# Patient Record
Sex: MALE | Employment: UNEMPLOYED | URBAN - METROPOLITAN AREA
[De-identification: names, ages, dates, MRNs, and addresses within clinical notes are randomized per-mention and may not be internally consistent; named-entity substitution may affect disease eponyms.]

---

## 2019-05-24 ENCOUNTER — HOSPITAL ENCOUNTER (INPATIENT)
Age: 19
LOS: 14 days | Discharge: NURSING, REHAB OR LONG-TERM CARE | DRG: 641 | End: 2019-06-07
Attending: EMERGENCY MEDICINE | Admitting: INTERNAL MEDICINE
Payer: COMMERCIAL

## 2019-05-24 DIAGNOSIS — E83.42 HYPOMAGNESEMIA: ICD-10-CM

## 2019-05-24 DIAGNOSIS — E83.51 HYPOCALCEMIA: Primary | ICD-10-CM

## 2019-05-24 PROBLEM — R94.31 PROLONGED Q-T INTERVAL ON ECG: Status: ACTIVE | Noted: 2019-05-24

## 2019-05-24 PROBLEM — E89.0 H/O THYROIDECTOMY: Status: ACTIVE | Noted: 2019-05-24

## 2019-05-24 LAB
ALBUMIN SERPL-MCNC: 4.9 G/DL (ref 3.5–5.2)
ALP BLD-CCNC: 122 U/L (ref 40–129)
ALT SERPL-CCNC: 45 U/L (ref 0–40)
ANION GAP SERPL CALCULATED.3IONS-SCNC: 14 MMOL/L (ref 7–16)
AST SERPL-CCNC: 43 U/L (ref 0–39)
BASOPHILS ABSOLUTE: 0.05 E9/L (ref 0–0.2)
BASOPHILS RELATIVE PERCENT: 1 % (ref 0–2)
BILIRUB SERPL-MCNC: 1.4 MG/DL (ref 0–1.2)
BILIRUBIN URINE: NEGATIVE
BLOOD, URINE: NEGATIVE
BUN BLDV-MCNC: 10 MG/DL (ref 6–20)
CALCIUM IONIZED: 0.69 MMOL/L (ref 1.15–1.33)
CALCIUM SERPL-MCNC: 5.2 MG/DL (ref 8.6–10.2)
CHLORIDE BLD-SCNC: 102 MMOL/L (ref 98–107)
CLARITY: CLEAR
CO2: 28 MMOL/L (ref 22–29)
COLOR: YELLOW
CREAT SERPL-MCNC: 1.1 MG/DL (ref 0.7–1.2)
EKG ATRIAL RATE: 83 BPM
EKG P AXIS: 73 DEGREES
EKG P-R INTERVAL: 180 MS
EKG Q-T INTERVAL: 426 MS
EKG QRS DURATION: 90 MS
EKG QTC CALCULATION (BAZETT): 500 MS
EKG R AXIS: 87 DEGREES
EKG T AXIS: 66 DEGREES
EKG VENTRICULAR RATE: 83 BPM
EOSINOPHILS ABSOLUTE: 0.02 E9/L (ref 0.05–0.5)
EOSINOPHILS RELATIVE PERCENT: 0.4 % (ref 0–6)
GFR AFRICAN AMERICAN: >60
GFR NON-AFRICAN AMERICAN: >60 ML/MIN/1.73
GLUCOSE BLD-MCNC: 91 MG/DL (ref 74–99)
GLUCOSE URINE: NEGATIVE MG/DL
HCT VFR BLD CALC: 39.1 % (ref 37–54)
HEMOGLOBIN: 13.8 G/DL (ref 12.5–16.5)
IMMATURE GRANULOCYTES #: 0.01 E9/L
IMMATURE GRANULOCYTES %: 0.2 % (ref 0–5)
KETONES, URINE: NEGATIVE MG/DL
LEUKOCYTE ESTERASE, URINE: NEGATIVE
LYMPHOCYTES ABSOLUTE: 1.14 E9/L (ref 1.5–4)
LYMPHOCYTES RELATIVE PERCENT: 23.5 % (ref 20–42)
MAGNESIUM: 1.5 MG/DL (ref 1.6–2.6)
MCH RBC QN AUTO: 30.2 PG (ref 26–35)
MCHC RBC AUTO-ENTMCNC: 35.3 % (ref 32–34.5)
MCV RBC AUTO: 85.6 FL (ref 80–99.9)
MONOCYTES ABSOLUTE: 0.4 E9/L (ref 0.1–0.95)
MONOCYTES RELATIVE PERCENT: 8.2 % (ref 2–12)
NEUTROPHILS ABSOLUTE: 3.24 E9/L (ref 1.8–7.3)
NEUTROPHILS RELATIVE PERCENT: 66.7 % (ref 43–80)
NITRITE, URINE: NEGATIVE
PDW BLD-RTO: 11.4 FL (ref 11.5–15)
PH UA: 7.5 (ref 5–9)
PLATELET # BLD: 235 E9/L (ref 130–450)
PMV BLD AUTO: 10.8 FL (ref 7–12)
POTASSIUM SERPL-SCNC: 4.5 MMOL/L (ref 3.5–5)
PROTEIN UA: NEGATIVE MG/DL
RBC # BLD: 4.57 E12/L (ref 3.8–5.8)
SODIUM BLD-SCNC: 144 MMOL/L (ref 132–146)
SPECIFIC GRAVITY UA: 1.01 (ref 1–1.03)
TOTAL PROTEIN: 7.6 G/DL (ref 6.4–8.3)
UROBILINOGEN, URINE: 1 E.U./DL
WBC # BLD: 4.9 E9/L (ref 4.5–11.5)

## 2019-05-24 PROCEDURE — 2140000000 HC CCU INTERMEDIATE R&B

## 2019-05-24 PROCEDURE — 82330 ASSAY OF CALCIUM: CPT

## 2019-05-24 PROCEDURE — 96365 THER/PROPH/DIAG IV INF INIT: CPT

## 2019-05-24 PROCEDURE — 93010 ELECTROCARDIOGRAM REPORT: CPT | Performed by: INTERNAL MEDICINE

## 2019-05-24 PROCEDURE — 6360000002 HC RX W HCPCS: Performed by: PHYSICIAN ASSISTANT

## 2019-05-24 PROCEDURE — 80053 COMPREHEN METABOLIC PANEL: CPT

## 2019-05-24 PROCEDURE — 83735 ASSAY OF MAGNESIUM: CPT

## 2019-05-24 PROCEDURE — 93005 ELECTROCARDIOGRAM TRACING: CPT | Performed by: NURSE PRACTITIONER

## 2019-05-24 PROCEDURE — 6360000002 HC RX W HCPCS: Performed by: INTERNAL MEDICINE

## 2019-05-24 PROCEDURE — 36415 COLL VENOUS BLD VENIPUNCTURE: CPT

## 2019-05-24 PROCEDURE — 96367 TX/PROPH/DG ADDL SEQ IV INF: CPT

## 2019-05-24 PROCEDURE — 2580000003 HC RX 258

## 2019-05-24 PROCEDURE — 85025 COMPLETE CBC W/AUTO DIFF WBC: CPT

## 2019-05-24 PROCEDURE — 81003 URINALYSIS AUTO W/O SCOPE: CPT

## 2019-05-24 PROCEDURE — 2580000003 HC RX 258: Performed by: INTERNAL MEDICINE

## 2019-05-24 PROCEDURE — 99285 EMERGENCY DEPT VISIT HI MDM: CPT

## 2019-05-24 PROCEDURE — 2500000003 HC RX 250 WO HCPCS: Performed by: PHYSICIAN ASSISTANT

## 2019-05-24 RX ORDER — ACETAMINOPHEN 325 MG/1
650 TABLET ORAL EVERY 6 HOURS PRN
Status: ON HOLD | COMMUNITY
End: 2019-06-06 | Stop reason: HOSPADM

## 2019-05-24 RX ORDER — ONDANSETRON 2 MG/ML
4 INJECTION INTRAMUSCULAR; INTRAVENOUS EVERY 6 HOURS PRN
Status: CANCELLED | OUTPATIENT
Start: 2019-05-24

## 2019-05-24 RX ORDER — MAGNESIUM SULFATE IN WATER 40 MG/ML
2 INJECTION, SOLUTION INTRAVENOUS ONCE
Status: COMPLETED | OUTPATIENT
Start: 2019-05-24 | End: 2019-05-24

## 2019-05-24 RX ORDER — SODIUM CHLORIDE 0.9 % (FLUSH) 0.9 %
SYRINGE (ML) INJECTION
Status: COMPLETED
Start: 2019-05-24 | End: 2019-05-24

## 2019-05-24 RX ORDER — DOCUSATE SODIUM 100 MG/1
100 CAPSULE, LIQUID FILLED ORAL NIGHTLY
Status: DISCONTINUED | OUTPATIENT
Start: 2019-05-24 | End: 2019-05-25

## 2019-05-24 RX ORDER — IBUPROFEN 200 MG
1 CAPSULE ORAL 4 TIMES DAILY
COMMUNITY

## 2019-05-24 RX ORDER — ACETAMINOPHEN 325 MG/1
650 TABLET ORAL EVERY 4 HOURS PRN
Status: DISCONTINUED | OUTPATIENT
Start: 2019-05-24 | End: 2019-06-07 | Stop reason: HOSPADM

## 2019-05-24 RX ORDER — DOCUSATE SODIUM 100 MG/1
100 CAPSULE, LIQUID FILLED ORAL 2 TIMES DAILY PRN
Status: ON HOLD | COMMUNITY
End: 2019-06-06 | Stop reason: HOSPADM

## 2019-05-24 RX ORDER — SODIUM CHLORIDE 0.9 % (FLUSH) 0.9 %
10 SYRINGE (ML) INJECTION 2 TIMES DAILY
Status: DISCONTINUED | OUTPATIENT
Start: 2019-05-24 | End: 2019-06-07 | Stop reason: HOSPADM

## 2019-05-24 RX ORDER — CALCITRIOL 0.5 UG/1
0.5 CAPSULE, LIQUID FILLED ORAL 2 TIMES DAILY
Status: ON HOLD | COMMUNITY
End: 2019-06-06 | Stop reason: HOSPADM

## 2019-05-24 RX ORDER — PANTOPRAZOLE SODIUM 40 MG/1
40 TABLET, DELAYED RELEASE ORAL
Status: DISCONTINUED | OUTPATIENT
Start: 2019-05-25 | End: 2019-06-05

## 2019-05-24 RX ADMIN — Medication 10 ML: at 18:39

## 2019-05-24 RX ADMIN — MAGNESIUM SULFATE HEPTAHYDRATE 2 G: 40 INJECTION, SOLUTION INTRAVENOUS at 15:57

## 2019-05-24 RX ADMIN — ENOXAPARIN SODIUM 40 MG: 40 INJECTION SUBCUTANEOUS at 18:39

## 2019-05-24 RX ADMIN — CALCIUM GLUCONATE 1 G: 98 INJECTION, SOLUTION INTRAVENOUS at 18:39

## 2019-05-24 RX ADMIN — Medication 1 G: at 14:51

## 2019-05-24 RX ADMIN — CALCIUM GLUCONATE: 98 INJECTION, SOLUTION INTRAVENOUS at 19:50

## 2019-05-24 ASSESSMENT — PAIN SCALES - GENERAL: PAINLEVEL_OUTOF10: 0

## 2019-05-24 NOTE — ED PROVIDER NOTES
bilaterally, no wheezes, rales, or rhonchi. Not in respiratory distress  · CV:  Regular rate. Regular rhythm. No murmurs, gallops, or rubs. 2+ distal pulses  · Chest: No chest wall tenderness  · GI:  Abdomen Soft, Non tender, Non distended. +BS. No rebound, guarding, or rigidity. No pulsatile masses. · Musculoskeletal: Moves all extremities x 4. Warm and well perfused, no clubbing, cyanosis, or edema. Capillary refill <3 seconds  · Integument: skin warm and dry. No rashes.    · Lymphatic: no lymphadenopathy noted  · Neurologic: GCS 15, no focal deficits, symmetric strength 5/5 in the upper and lower extremities bilaterally  · Psychiatric: Normal Affect      Lab / Imaging Results   (All laboratory and radiology results have been personally reviewed by myself)  Labs:  Results for orders placed or performed during the hospital encounter of 05/24/19   CBC Auto Differential   Result Value Ref Range    WBC 4.9 4.5 - 11.5 E9/L    RBC 4.57 3.80 - 5.80 E12/L    Hemoglobin 13.8 12.5 - 16.5 g/dL    Hematocrit 39.1 37.0 - 54.0 %    MCV 85.6 80.0 - 99.9 fL    MCH 30.2 26.0 - 35.0 pg    MCHC 35.3 (H) 32.0 - 34.5 %    RDW 11.4 (L) 11.5 - 15.0 fL    Platelets 953 367 - 670 E9/L    MPV 10.8 7.0 - 12.0 fL    Neutrophils % 66.7 43.0 - 80.0 %    Immature Granulocytes % 0.2 0.0 - 5.0 %    Lymphocytes % 23.5 20.0 - 42.0 %    Monocytes % 8.2 2.0 - 12.0 %    Eosinophils % 0.4 0.0 - 6.0 %    Basophils % 1.0 0.0 - 2.0 %    Neutrophils # 3.24 1.80 - 7.30 E9/L    Immature Granulocytes # 0.01 E9/L    Lymphocytes # 1.14 (L) 1.50 - 4.00 E9/L    Monocytes # 0.40 0.10 - 0.95 E9/L    Eosinophils # 0.02 (L) 0.05 - 0.50 E9/L    Basophils # 0.05 0.00 - 0.20 E9/L   Comprehensive Metabolic Panel   Result Value Ref Range    Sodium 144 132 - 146 mmol/L    Potassium 4.5 3.5 - 5.0 mmol/L    Chloride 102 98 - 107 mmol/L    CO2 28 22 - 29 mmol/L    Anion Gap 14 7 - 16 mmol/L    Glucose 91 74 - 99 mg/dL    BUN 10 6 - 20 mg/dL    CREATININE 1.1 0.7 - 1.2 mg/dL    GFR Non-African American >60 >=60 mL/min/1.73    GFR African American >60     Calcium 5.2 (LL) 8.6 - 10.2 mg/dL    Total Protein 7.6 6.4 - 8.3 g/dL    Alb 4.9 3.5 - 5.2 g/dL    Total Bilirubin 1.4 (H) 0.0 - 1.2 mg/dL    Alkaline Phosphatase 122 40 - 129 U/L    ALT 45 (H) 0 - 40 U/L    AST 43 (H) 0 - 39 U/L   Magnesium   Result Value Ref Range    Magnesium 1.5 (L) 1.6 - 2.6 mg/dL   Urinalysis   Result Value Ref Range    Color, UA Yellow Straw/Yellow    Clarity, UA Clear Clear    Glucose, Ur Negative Negative mg/dL    Bilirubin Urine Negative Negative    Ketones, Urine Negative Negative mg/dL    Specific Gravity, UA 1.010 1.005 - 1.030    Blood, Urine Negative Negative    pH, UA 7.5 5.0 - 9.0    Protein, UA Negative Negative mg/dL    Urobilinogen, Urine 1.0 <2.0 E.U./dL    Nitrite, Urine Negative Negative    Leukocyte Esterase, Urine Negative Negative   CALCIUM, IONIZED   Result Value Ref Range    Calcium, Ion 0.69 (LL) 1.15 - 1.33 mmol/L   EKG 12 Lead   Result Value Ref Range    Ventricular Rate 83 BPM    Atrial Rate 83 BPM    P-R Interval 180 ms    QRS Duration 90 ms    Q-T Interval 426 ms    QTc Calculation (Bazett) 500 ms    P Axis 73 degrees    R Axis 87 degrees    T Axis 66 degrees     Imaging: All Radiology results interpreted by Radiologist unless otherwise noted. No orders to display     EKG #1:  Interpreted by emergency department physician unless otherwise noted. Time:  1255    Rate: 83  Rhythm: Sinus. Interpretation: normal sinus rhythm, prolonged QT interval, there are no previous tracings available for comparison.       ED Course / Medical Decision Making     Medications   docusate sodium (COLACE) capsule 100 mg (has no administration in time range)   pantoprazole (PROTONIX) tablet 40 mg (has no administration in time range)   enoxaparin (LOVENOX) injection 40 mg (has no administration in time range)   acetaminophen (TYLENOL) tablet 650 mg (has no administration in time range)   vitamin D 1800

## 2019-05-24 NOTE — H&P
has no tobacco history on file. ETOH:   has no alcohol history on file. Had a past history of marijuana use    Family History:   Mom  of an MI at 28    , dads history unknown and MI at 28    REVIEW OF SYSTEMS:   Pertinent positives as noted in the HPI. All other systems reviewed and negative. PHYSICAL EXAM:    BP (!) 147/96   Pulse 85   Temp 97.1 °F (36.2 °C) (Temporal)   Resp 16   Wt 190 lb (86.2 kg)   SpO2 100%     General appearance:  No apparent distress, appears stated age and cooperative. HEENT:  Normal cephalic, atraumatic without obvious deformity. Pupils equal, round, and reactive to light. Extra ocular muscles intact. Conjunctivae/corneas clear. Neck: Supple, with full range of motion. No jugular venous distention. Trachea midline. Surgical scar in line with previous surgery  Respiratory:  Normal respiratory effort. Clear to auscultation, bilaterally without Rales/Wheezes/Rhonchi. Cardiovascular:  Regular rate and rhythm with normal S1/S2 without murmurs, rubs or gallops. Abdomen: Soft, non-tender, non-distended with normal bowel sounds. Musculoskeletal:  No clubbing, cyanosis or edema bilaterally. Full range of motion without deformity. Skin: Skin color, texture, turgor normal.  No rashes or lesions. Neurologic:  Neurovascularly intact without any focal sensory/motor deficits. Cranial nerves: II-XII intact, grossly non-focal.  Psychiatric:  Alert and oriented, thought content appropriate, normal insight  Capillary Refill: Brisk,< 3 seconds   Peripheral Pulses: +2 palpable, equal bilaterally       Labs:     Recent Labs     19  1300   WBC 4.9   HGB 13.8   HCT 39.1        Recent Labs     19  1300      K 4.5      CO2 28   BUN 10   CREATININE 1.1   CALCIUM 5.2*     Recent Labs     19  1300   AST 43*   ALT 45*   BILITOT 1.4*   ALKPHOS 122     No results for input(s): INR in the last 72 hours.   No results for input(s): Brice Risser in the last 72 hours.    Urinalysis:    No results found for: Vallery Perches, BACTERIA, RBCUA, BLOODU, [address] [place] [address]    Radiology:         No orders to display       ASSESSMENT:    Active Hospital Problems    Diagnosis Date Noted    Hypocalcemia [E83.51] 2019    Prolonged Q-T interval on ECG [R94.31] 2019    Hypomagnesemia [E83.42] 2019    H/O thyroidectomy [Z98.890] 2019    Hypocalcemia and hypomagnesemia of  [P71.1] 2019   transamintis   h/o parathyroidectomy  H/o thyroid cancer      PLAN:  Intact parathyroid  Consult nephro  Monitor labs  Replace k  Hepatitis panel      DVT Prophylaxis: *lovenox  Diet: No diet orders on file  Code Status: full code    PT/OT Eval Status: *na    Dispo - *back to long-term       John Paul Dowling DO    Thank you No primary care provider on file. for the opportunity to be involved in this patient's care. If you have any questions or concerns please feel free to contact me at [phone].

## 2019-05-25 LAB
ANION GAP SERPL CALCULATED.3IONS-SCNC: 18 MMOL/L (ref 7–16)
BUN BLDV-MCNC: 9 MG/DL (ref 6–20)
CALCIUM IONIZED: 0.72 MMOL/L (ref 1.15–1.33)
CALCIUM IONIZED: 0.73 MMOL/L (ref 1.15–1.33)
CALCIUM IONIZED: 0.84 MMOL/L (ref 1.15–1.33)
CALCIUM IONIZED: 0.85 MMOL/L (ref 1.15–1.33)
CALCIUM IONIZED: 0.88 MMOL/L (ref 1.15–1.33)
CALCIUM SERPL-MCNC: 5.2 MG/DL (ref 8.6–10.2)
CHLORIDE BLD-SCNC: 98 MMOL/L (ref 98–107)
CO2: 24 MMOL/L (ref 22–29)
CREAT SERPL-MCNC: 1 MG/DL (ref 0.7–1.2)
GFR AFRICAN AMERICAN: >60
GFR NON-AFRICAN AMERICAN: >60 ML/MIN/1.73
GLUCOSE BLD-MCNC: 97 MG/DL (ref 74–99)
MAGNESIUM: 1.3 MG/DL (ref 1.6–2.6)
MAGNESIUM: 1.7 MG/DL (ref 1.6–2.6)
PARATHYROID HORMONE INTACT: 4 PG/ML (ref 15–65)
PARATHYROID HORMONE INTACT: 5 PG/ML (ref 15–65)
PHOSPHORUS: 9 MG/DL (ref 2.5–4.5)
POTASSIUM REFLEX MAGNESIUM: 3.7 MMOL/L (ref 3.5–5)
SODIUM BLD-SCNC: 140 MMOL/L (ref 132–146)
TSH SERPL DL<=0.05 MIU/L-ACNC: 2.61 UIU/ML (ref 0.27–4.2)
TSH SERPL DL<=0.05 MIU/L-ACNC: 2.62 UIU/ML (ref 0.27–4.2)
VITAMIN D 25-HYDROXY: 16 NG/ML (ref 30–100)

## 2019-05-25 PROCEDURE — 6360000002 HC RX W HCPCS: Performed by: INTERNAL MEDICINE

## 2019-05-25 PROCEDURE — 84100 ASSAY OF PHOSPHORUS: CPT

## 2019-05-25 PROCEDURE — 82306 VITAMIN D 25 HYDROXY: CPT

## 2019-05-25 PROCEDURE — 2140000000 HC CCU INTERMEDIATE R&B

## 2019-05-25 PROCEDURE — 80074 ACUTE HEPATITIS PANEL: CPT

## 2019-05-25 PROCEDURE — 84443 ASSAY THYROID STIM HORMONE: CPT

## 2019-05-25 PROCEDURE — 80048 BASIC METABOLIC PNL TOTAL CA: CPT

## 2019-05-25 PROCEDURE — 82330 ASSAY OF CALCIUM: CPT

## 2019-05-25 PROCEDURE — 83735 ASSAY OF MAGNESIUM: CPT

## 2019-05-25 PROCEDURE — 2580000003 HC RX 258: Performed by: INTERNAL MEDICINE

## 2019-05-25 PROCEDURE — 6370000000 HC RX 637 (ALT 250 FOR IP): Performed by: INTERNAL MEDICINE

## 2019-05-25 PROCEDURE — 83970 ASSAY OF PARATHORMONE: CPT

## 2019-05-25 PROCEDURE — 36415 COLL VENOUS BLD VENIPUNCTURE: CPT

## 2019-05-25 RX ORDER — MAGNESIUM SULFATE IN WATER 40 MG/ML
2 INJECTION, SOLUTION INTRAVENOUS PRN
Status: DISCONTINUED | OUTPATIENT
Start: 2019-05-25 | End: 2019-05-28

## 2019-05-25 RX ORDER — ERGOCALCIFEROL 1.25 MG/1
50000 CAPSULE ORAL WEEKLY
Status: DISCONTINUED | OUTPATIENT
Start: 2019-05-26 | End: 2019-06-07 | Stop reason: HOSPADM

## 2019-05-25 RX ORDER — MAGNESIUM SULFATE IN WATER 40 MG/ML
2 INJECTION, SOLUTION INTRAVENOUS ONCE
Status: COMPLETED | OUTPATIENT
Start: 2019-05-25 | End: 2019-05-25

## 2019-05-25 RX ORDER — DOCUSATE SODIUM 100 MG/1
100 CAPSULE, LIQUID FILLED ORAL 3 TIMES DAILY
Status: DISCONTINUED | OUTPATIENT
Start: 2019-05-25 | End: 2019-05-27

## 2019-05-25 RX ORDER — CHOLECALCIFEROL (VITAMIN D3) 50 MCG
5000 TABLET ORAL DAILY
Status: DISCONTINUED | OUTPATIENT
Start: 2019-05-26 | End: 2019-05-27

## 2019-05-25 RX ORDER — CALCIUM CARBONATE 200(500)MG
500 TABLET,CHEWABLE ORAL 3 TIMES DAILY
Status: DISCONTINUED | OUTPATIENT
Start: 2019-05-25 | End: 2019-05-26

## 2019-05-25 RX ADMIN — VITAMIN D, TAB 1000IU (100/BT) 1000 UNITS: 25 TAB at 08:22

## 2019-05-25 RX ADMIN — Medication 10 ML: at 20:36

## 2019-05-25 RX ADMIN — CALCIUM ACETATE 1334 MG: 667 CAPSULE ORAL at 16:54

## 2019-05-25 RX ADMIN — ENOXAPARIN SODIUM 40 MG: 40 INJECTION SUBCUTANEOUS at 08:22

## 2019-05-25 RX ADMIN — MAGNESIUM SULFATE HEPTAHYDRATE 2 G: 40 INJECTION, SOLUTION INTRAVENOUS at 08:22

## 2019-05-25 RX ADMIN — CALCIUM ACETATE 1334 MG: 667 CAPSULE ORAL at 11:55

## 2019-05-25 RX ADMIN — CALCIUM GLUCONATE 2 G: 98 INJECTION, SOLUTION INTRAVENOUS at 08:22

## 2019-05-25 RX ADMIN — PANTOPRAZOLE SODIUM 40 MG: 40 TABLET, DELAYED RELEASE ORAL at 06:50

## 2019-05-25 RX ADMIN — DOCUSATE SODIUM 100 MG: 100 CAPSULE, LIQUID FILLED ORAL at 20:35

## 2019-05-25 RX ADMIN — CALCIUM GLUCONATE: 98 INJECTION, SOLUTION INTRAVENOUS at 08:22

## 2019-05-25 RX ADMIN — MAGNESIUM SULFATE HEPTAHYDRATE 2 G: 40 INJECTION, SOLUTION INTRAVENOUS at 10:26

## 2019-05-25 RX ADMIN — CALCIUM CARBONATE (ANTACID) CHEW TAB 500 MG 500 MG: 500 CHEW TAB at 20:35

## 2019-05-25 ASSESSMENT — PAIN SCALES - GENERAL
PAINLEVEL_OUTOF10: 0

## 2019-05-25 NOTE — CONSULTS
Nephrology Consult Note      Reason for Consult:  hypocalcemia    Requesting Physician:  Dr Dano Paiz:  paraesthesias, spasms    HISTORY OF PRESENT ILLNESS:      22 yr old male patient with pmh of thyroid and parathyroid cancer? S/p resection presented with paresthesia and numbness. Labs work done in hospital showed significant hypocalcemia and nephrology consulted for management of the same. Past Medical History:    No past medical history on file. Past Surgical History:    No past surgical history on file. Current Medications:    Current Facility-Administered Medications: calcium gluconate 12 g in sodium chloride 0.9 % 1,000 mL infusion, , Intravenous, Continuous  magnesium sulfate 2 g in 50 mL IVPB premix, 2 g, Intravenous, PRN  calcium acetate (PHOSLO) capsule 1,334 mg, 1,334 mg, Oral, TID WC  [START ON 5/26/2019] vitamin D (CHOLECALCIFEROL) tablet 5,000 Units, 5,000 Units, Oral, Daily  [START ON 5/26/2019] vitamin D (ERGOCALCIFEROL) capsule 50,000 Units, 50,000 Units, Oral, Weekly  docusate sodium (COLACE) capsule 100 mg, 100 mg, Oral, TID  pantoprazole (PROTONIX) tablet 40 mg, 40 mg, Oral, QAM AC  enoxaparin (LOVENOX) injection 40 mg, 40 mg, Subcutaneous, Daily  acetaminophen (TYLENOL) tablet 650 mg, 650 mg, Oral, Q4H PRN  trimethobenzamide (TIGAN) injection 200 mg, 200 mg, Intramuscular, Q6H PRN  sodium chloride flush 0.9 % injection 10 mL, 10 mL, Intravenous, BID  Allergies:  Patient has no known allergies.     Social History:    Social History     Socioeconomic History    Marital status: Single     Spouse name: Not on file    Number of children: Not on file    Years of education: Not on file    Highest education level: Not on file   Occupational History    Not on file   Social Needs    Financial resource strain: Not on file    Food insecurity:     Worry: Not on file     Inability: Not on file    Transportation needs:     Medical: Not on file     Non-medical: Not on file   Tobacco Use    Smoking status: Never Smoker    Smokeless tobacco: Never Used   Substance and Sexual Activity    Alcohol use: Not on file    Drug use: Not on file    Sexual activity: Not on file   Lifestyle    Physical activity:     Days per week: Not on file     Minutes per session: Not on file    Stress: Not on file   Relationships    Social connections:     Talks on phone: Not on file     Gets together: Not on file     Attends Druze service: Not on file     Active member of club or organization: Not on file     Attends meetings of clubs or organizations: Not on file     Relationship status: Not on file    Intimate partner violence:     Fear of current or ex partner: Not on file     Emotionally abused: Not on file     Physically abused: Not on file     Forced sexual activity: Not on file   Other Topics Concern    Not on file   Social History Narrative    Not on file       Family History:   No family history on file. REVIEW OF SYSTEMS:      10 point ros done and negative unless specified in the HPI     PHYSICAL EXAM:      Vitals:    VITALS:  BP (!) 146/86   Pulse 72   Temp 97.6 °F (36.4 °C) (Temporal)   Resp 16   Ht 6' 1\" (1.854 m)   Wt 192 lb 8 oz (87.3 kg)   SpO2 99%   BMI 25.40 kg/m²     GENERAL: Mild distress, weak tired overall stable   EYES: no pallor, no icterus   NOSE EAR THROAT: no ulcers, no rashes, no drainage   NECK: thyroid not palpable, LN not appreciated   RESP: air entry b/l , No added sounds. CVS: S1 S2 heard, No murmur gallop or rub appreciated   GI: soft, non tender, no organomegaly appreciated, BS +  MS/ Ext: Joints normal, no swelling. No edema, Pulses equal.   NEURO: AAOx3, strength and sensation grossly intact.      DATA:    CBC:   Lab Results   Component Value Date    WBC 4.9 05/24/2019    RBC 4.57 05/24/2019    HGB 13.8 05/24/2019    HCT 39.1 05/24/2019    MCV 85.6 05/24/2019    MCH 30.2 05/24/2019    MCHC 35.3 05/24/2019    RDW 11.4 05/24/2019     05/24/2019    MPV 10.8 05/24/2019     CMP:    Lab Results   Component Value Date     05/25/2019    K 3.7 05/25/2019    CL 98 05/25/2019    CO2 24 05/25/2019    BUN 9 05/25/2019    CREATININE 1.0 05/25/2019    GFRAA >60 05/25/2019    LABGLOM >60 05/25/2019    GLUCOSE 97 05/25/2019    PROT 7.6 05/24/2019    LABALBU 4.9 05/24/2019    CALCIUM 5.2 05/25/2019    BILITOT 1.4 05/24/2019    ALKPHOS 122 05/24/2019    AST 43 05/24/2019    ALT 45 05/24/2019     ABG:  No results found for: PHART, KLC0GNA, PO2ART, JWE1EEB, BEART, THGBART, QBW7LMY, A7HHUWPU    IMPRESSION/RECOMMENDATIONS:      1. Hypocalcemia: s/p parathyroidectomy / low PTH   2. Hyperphosphatemia : calcitriol use? 3. Hypomagnesemia   4. Severe Vit d def     1. As of now continuing with calcium supplement IV   2. Will avoid very aggressive IV supplementation due to high phos as well   3. If by tomorrow Calcium not improving well + phos continues to run high will have to consider HD support   4. With temp HD attempt to clear phos and supplement Calcium / once phos normalizes dc temp HD   5. As of now aggressive Vit D supplementation   6. Phoslo TID WM   7. Increased colace due to risk of constipation   8. Aggressive magnesium supplement   9.  F/u labs closely

## 2019-05-25 NOTE — PLAN OF CARE
Problem: Fluid Volume:  Goal: Ability to achieve a balanced intake and output will improve  Description  Ability to achieve a balanced intake and output will improve  Outcome: Ongoing     Problem: Physical Regulation:  Goal: Will show no signs and symptoms of electrolyte imbalance  Description  Will show no signs and symptoms of electrolyte imbalance  Outcome: Ongoing

## 2019-05-25 NOTE — PROGRESS NOTES
Hospitalist Progress Note      PCP: No primary care provider on file. Date of Admission: 5/24/2019    Chief Complaint: *paraesthesias, spasms    **    Hospital Course: * He has a known history of having had 3 neck surgeries. He said he has his thyroid removed due to cancer, he then had his parathyroid removed, and he had another surgery that he does not know the reason on  His neck. He has had muscle spasms, tingling in his hands and mouth, and fatigue, becoming progressively worse   given calcium gluconate and mag     Subjective: **feeling better      Medications:  Reviewed    Infusion Medications    IV infusion builder 40 mL/hr at 05/25/19 9490     Scheduled Medications    calcium acetate  1,334 mg Oral TID WC    docusate sodium  100 mg Oral Nightly    pantoprazole  40 mg Oral QAM AC    enoxaparin  40 mg Subcutaneous Daily    vitamin D  1,000 Units Oral Daily    sodium chloride flush  10 mL Intravenous BID     PRN Meds: magnesium sulfate, acetaminophen, trimethobenzamide      Intake/Output Summary (Last 24 hours) at 5/25/2019 1511  Last data filed at 5/25/2019 1437  Gross per 24 hour   Intake 2334 ml   Output 2475 ml   Net -141 ml       Exam:    BP (!) 139/93   Pulse 88   Temp 97.7 °F (36.5 °C) (Temporal)   Resp 16   Ht 6' 1\" (1.854 m)   Wt 192 lb 8 oz (87.3 kg)   SpO2 100%   BMI 25.40 kg/m²       General appearance:  No apparent distress, appears stated age and cooperative. HEENT:  Normal cephalic, atraumatic without obvious deformity. Pupils equal, round, and reactive to light. Extra ocular muscles intact. Conjunctivae/corneas clear. Neck: Supple, with full range of motion. No jugular venous distention. Trachea midline. Surgical scar in line with previous surgery  Respiratory:  Normal respiratory effort. Clear to auscultation, bilaterally without Rales/Wheezes/Rhonchi.   Cardiovascular:  Regular rate and rhythm   Abdomen: Soft, non-tender, non-distended with normal bowel sounds. Musculoskeletal:  No clubbing, cyanosis or edema bilaterally. Full range of motion without deformity. Skin: Skin color, texture, turgor normal.  No rashes or lesions. Neurologic:  Neurovascularly intact without any focal sensory/motor deficits. Cranial nerves: II-XII intact, grossly non-focal.  Psychiatric:  Alert and oriented, thought content appropriate, normal insight  Capillary Refill: Brisk,< 3 seconds   Peripheral Pulses: +2 palpable, equal bilaterally                  Labs:   Recent Labs     19  1300   WBC 4.9   HGB 13.8   HCT 39.1        Recent Labs     19  1300 19  0502    140   K 4.5 3.7    98   CO2 28 24   BUN 10 9   CREATININE 1.1 1.0   CALCIUM 5.2* 5.2*   PHOS  --  9.0*     Recent Labs     19  1300   AST 43*   ALT 45*   BILITOT 1.4*   ALKPHOS 122     No results for input(s): INR in the last 72 hours. No results for input(s): Evern [place] in the last 72 hours. Recent Labs     19  1300   AST 43*   ALT 45*   BILITOT 1.4*   ALKPHOS 122     No results for input(s): LACTA in the last 72 hours.   No results found for: Trude Balzarine  No results found for: AMMONIA    Assessment:    Active Hospital Problems    Diagnosis Date Noted    Hypocalcemia [E83.51] 2019    Prolonged Q-T interval on ECG [R94.31] 2019    Hypomagnesemia [E83.42] 2019    H/O thyroidectomy [Z98.890] 2019    Hypocalcemia and hypomagnesemia of  [P71.1] 2019       Plan:  *Consult nephro  Monitor labs  Replace k  Hepatitis panel        DVT Prophylaxis: *lovenox  Diet: No diet orders on file  Code Status: full code     PT/OT Eval Status: *na     [facility]            Electronically signed by Cee Ventura DO on 2019 at 3:11 PM

## 2019-05-26 LAB
CALCIUM IONIZED: 0.81 MMOL/L (ref 1.15–1.33)
CALCIUM IONIZED: 0.89 MMOL/L (ref 1.15–1.33)
CALCIUM IONIZED: 0.9 MMOL/L (ref 1.15–1.33)
MAGNESIUM: 1.4 MG/DL (ref 1.6–2.6)
MAGNESIUM: 1.7 MG/DL (ref 1.6–2.6)
PHOSPHORUS: 8.2 MG/DL (ref 2.5–4.5)

## 2019-05-26 PROCEDURE — 6360000002 HC RX W HCPCS: Performed by: INTERNAL MEDICINE

## 2019-05-26 PROCEDURE — 84100 ASSAY OF PHOSPHORUS: CPT

## 2019-05-26 PROCEDURE — 2580000003 HC RX 258: Performed by: INTERNAL MEDICINE

## 2019-05-26 PROCEDURE — 6370000000 HC RX 637 (ALT 250 FOR IP): Performed by: INTERNAL MEDICINE

## 2019-05-26 PROCEDURE — 82330 ASSAY OF CALCIUM: CPT

## 2019-05-26 PROCEDURE — 83735 ASSAY OF MAGNESIUM: CPT

## 2019-05-26 PROCEDURE — 36415 COLL VENOUS BLD VENIPUNCTURE: CPT

## 2019-05-26 PROCEDURE — 2140000000 HC CCU INTERMEDIATE R&B

## 2019-05-26 RX ORDER — CALCIUM CARBONATE 200(500)MG
2000 TABLET,CHEWABLE ORAL 3 TIMES DAILY
Status: DISCONTINUED | OUTPATIENT
Start: 2019-05-26 | End: 2019-06-02

## 2019-05-26 RX ORDER — CALCITRIOL 0.25 UG/1
1 CAPSULE, LIQUID FILLED ORAL DAILY
Status: DISCONTINUED | OUTPATIENT
Start: 2019-05-26 | End: 2019-06-07 | Stop reason: HOSPADM

## 2019-05-26 RX ADMIN — ERGOCALCIFEROL 50000 UNITS: 1.25 CAPSULE ORAL at 08:15

## 2019-05-26 RX ADMIN — DOCUSATE SODIUM 100 MG: 100 CAPSULE, LIQUID FILLED ORAL at 14:50

## 2019-05-26 RX ADMIN — CALCIUM ACETATE 1334 MG: 667 CAPSULE ORAL at 11:55

## 2019-05-26 RX ADMIN — DOCUSATE SODIUM 100 MG: 100 CAPSULE, LIQUID FILLED ORAL at 21:47

## 2019-05-26 RX ADMIN — CALCIUM CARBONATE (ANTACID) CHEW TAB 500 MG 500 MG: 500 CHEW TAB at 08:15

## 2019-05-26 RX ADMIN — ENOXAPARIN SODIUM 40 MG: 40 INJECTION SUBCUTANEOUS at 08:15

## 2019-05-26 RX ADMIN — CALCIUM ACETATE 1334 MG: 667 CAPSULE ORAL at 17:10

## 2019-05-26 RX ADMIN — MAGNESIUM SULFATE HEPTAHYDRATE 2 G: 40 INJECTION, SOLUTION INTRAVENOUS at 21:51

## 2019-05-26 RX ADMIN — DOCUSATE SODIUM 100 MG: 100 CAPSULE, LIQUID FILLED ORAL at 08:15

## 2019-05-26 RX ADMIN — Medication 10 ML: at 08:15

## 2019-05-26 RX ADMIN — VITAMIN D, TAB 1000IU (100/BT) 5000 UNITS: 25 TAB at 08:15

## 2019-05-26 RX ADMIN — Medication 10 ML: at 21:45

## 2019-05-26 RX ADMIN — CALCITRIOL 1 MCG: 0.25 CAPSULE ORAL at 11:55

## 2019-05-26 RX ADMIN — MAGNESIUM SULFATE HEPTAHYDRATE 2 G: 40 INJECTION, SOLUTION INTRAVENOUS at 08:15

## 2019-05-26 RX ADMIN — CALCIUM GLUCONATE: 98 INJECTION, SOLUTION INTRAVENOUS at 10:26

## 2019-05-26 RX ADMIN — MAGNESIUM SULFATE HEPTAHYDRATE 2 G: 40 INJECTION, SOLUTION INTRAVENOUS at 10:28

## 2019-05-26 RX ADMIN — CALCIUM CARBONATE 2000 MG: 500 TABLET, CHEWABLE ORAL at 14:50

## 2019-05-26 RX ADMIN — CALCIUM ACETATE 1334 MG: 667 CAPSULE ORAL at 08:15

## 2019-05-26 RX ADMIN — CALCIUM CARBONATE 2000 MG: 500 TABLET, CHEWABLE ORAL at 21:48

## 2019-05-26 ASSESSMENT — PAIN SCALES - GENERAL
PAINLEVEL_OUTOF10: 0

## 2019-05-26 NOTE — PROGRESS NOTES
Hospitalist Progress Note      PCP: No primary care provider on file. Date of Admission: 5/24/2019    Chief Complaint: *paraesthesias, spasms    **    Hospital Course: [facility] has a known history of having had 3 neck surgeries. He said he has his thyroid removed due to cancer, he then had his parathyroid removed, and he had another surgery that he does not know the reason on  His neck.  He has had muscle spasms, tingling in his hands and mouth, and fatigue, becoming progressively worse   given calcium gluconate and mag  . ? If may need temporary dialysis    **     Subjective: ** still has tingling of hands      Medications:  Reviewed    Infusion Medications    IV infusion builder 40 mL/hr at 05/26/19 1026     Scheduled Medications    calcium carbonate  2,000 mg Oral TID    calcitRIOL  1 mcg Oral Daily    calcium acetate  1,334 mg Oral TID WC    vitamin D  5,000 Units Oral Daily    vitamin D  50,000 Units Oral Weekly    docusate sodium  100 mg Oral TID    pantoprazole  40 mg Oral QAM AC    enoxaparin  40 mg Subcutaneous Daily    sodium chloride flush  10 mL Intravenous BID     PRN Meds: magnesium sulfate, acetaminophen, trimethobenzamide      Intake/Output Summary (Last 24 hours) at 5/26/2019 1501  Last data filed at 5/26/2019 1448  Gross per 24 hour   Intake 1382 ml   Output 4175 ml   Net -2793 ml       Exam:    /64   Pulse 88   Temp 98.3 °F (36.8 °C) (Temporal)   Resp 16   Ht 6' 1\" (1.854 m)   Wt 192 lb 8 oz (87.3 kg)   SpO2 98%   BMI 25.40 kg/m²       General appearance:  No apparent distress, appears stated age and cooperative. HEENT:  Normal cephalic, atraumatic without obvious deformity. Pupils equal, round, and reactive to light.  Extra ocular muscles intact. Conjunctivae/corneas clear. Neck: Supple, with full range of motion. No jugular venous distention. Trachea midline. Surgical scar in line with previous surgery  Respiratory:  Normal respiratory effort.  Clear to auscultation, bilaterally without Rales/Wheezes/Rhonchi. Cardiovascular:  Regular rate and rhythm   Abdomen: Soft, non-tender, non-distended with normal bowel sounds. Musculoskeletal:  No clubbing, cyanosis or edema bilaterally.    Skin: Skin color, texture, turgor normal.  No rashes or lesions. Neurologic:  Neurovascularly intact without any focal sensory/motor deficits. Cranial nerves: II-XII intact, grossly non-focal.  Psychiatric:  Alert and oriented, thought content appropriate, normal insight  Capillary Refill: Brisk,< 3 seconds   Peripheral Pulses: +2 palpable, equal bilaterally                  Labs:   Recent Labs     19  1300   WBC 4.9   HGB 13.8   HCT 39.1        Recent Labs     19  1300 19  0502 19  0608    140  --    K 4.5 3.7  --     98  --    CO2 28 24  --    BUN 10 9  --    CREATININE 1.1 1.0  --    CALCIUM 5.2* 5.2*  --    PHOS  --  9.0* 8.2*     Recent Labs     19  1300   AST 43*   ALT 45*   BILITOT 1.4*   ALKPHOS 122     No results for input(s): INR in the last 72 hours. No results for input(s): Nanetta Trent in the last 72 hours. Recent Labs     19  1300   AST 43*   ALT 45*   BILITOT 1.4*   ALKPHOS 122     No results for input(s): LACTA in the last 72 hours.   No results found for: Compa Christo  No results found for: AMMONIA    Assessment:    Active Hospital Problems    Diagnosis Date Noted    Hypocalcemia [E83.51] 2019    Prolonged Q-T interval on ECG [R94.31] 2019    Hypomagnesemia [E83.42] 2019    H/O thyroidectomy [Z98.890] 2019    Hypocalcemia and hypomagnesemia of  [P71.1] 2019       Plan:  *Monitor labs  Replace k  Hepatitis panel   cont phoslo     DVT Prophylaxis: *lovenox  Diet: No diet orders on file  Code Status: full code     PT/OT Eval Status: *na     Dispo - *back to group home         Electronically signed by Darius Davsi DO on 2019 at 3:01 PM

## 2019-05-26 NOTE — PROGRESS NOTES
Nephrology Progress Note        CHIEF COMPLAINT:  paraesthesias, spasms are better today. Current Medications:    Current Facility-Administered Medications: calcium gluconate 12 g in sodium chloride 0.9 % 1,000 mL infusion, , Intravenous, Continuous  magnesium sulfate 2 g in 50 mL IVPB premix, 2 g, Intravenous, PRN  calcium acetate (PHOSLO) capsule 1,334 mg, 1,334 mg, Oral, TID WC  vitamin D (CHOLECALCIFEROL) tablet 5,000 Units, 5,000 Units, Oral, Daily  vitamin D (ERGOCALCIFEROL) capsule 50,000 Units, 50,000 Units, Oral, Weekly  docusate sodium (COLACE) capsule 100 mg, 100 mg, Oral, TID  calcium carbonate (TUMS) chewable tablet 500 mg, 500 mg, Oral, TID  pantoprazole (PROTONIX) tablet 40 mg, 40 mg, Oral, QAM AC  enoxaparin (LOVENOX) injection 40 mg, 40 mg, Subcutaneous, Daily  acetaminophen (TYLENOL) tablet 650 mg, 650 mg, Oral, Q4H PRN  trimethobenzamide (TIGAN) injection 200 mg, 200 mg, Intramuscular, Q6H PRN  sodium chloride flush 0.9 % injection 10 mL, 10 mL, Intravenous, BID  Allergies:  Patient has no known allergies.     Social History:    Social History     Socioeconomic History    Marital status: Single     Spouse name: Not on file    Number of children: Not on file    Years of education: Not on file    Highest education level: Not on file   Occupational History    Not on file   Social Needs    Financial resource strain: Not on file    Food insecurity:     Worry: Not on file     Inability: Not on file    Transportation needs:     Medical: Not on file     Non-medical: Not on file   Tobacco Use    Smoking status: Never Smoker    Smokeless tobacco: Never Used   Substance and Sexual Activity    Alcohol use: Not on file    Drug use: Not on file    Sexual activity: Not on file   Lifestyle    Physical activity:     Days per week: Not on file     Minutes per session: Not on file    Stress: Not on file   Relationships    Social connections:     Talks on phone: Not on file     Gets together: Not on file     Attends Taoism service: Not on file     Active member of club or organization: Not on file     Attends meetings of clubs or organizations: Not on file     Relationship status: Not on file    Intimate partner violence:     Fear of current or ex partner: Not on file     Emotionally abused: Not on file     Physically abused: Not on file     Forced sexual activity: Not on file   Other Topics Concern    Not on file   Social History Narrative    Not on file       Family History:   No family history on file. REVIEW OF SYSTEMS:      10 point ros done and negative unless specified in the HPI     PHYSICAL EXAM:      Vitals:    VITALS:  /85   Pulse 88   Temp 98.1 °F (36.7 °C) (Temporal)   Resp 16   Ht 6' 1\" (1.854 m)   Wt 192 lb 8 oz (87.3 kg)   SpO2 100%   BMI 25.40 kg/m²     GENERAL: Mild distress, weak tired overall stable   EYES: no pallor, no icterus   NOSE EAR THROAT: no ulcers, no rashes, no drainage   NECK: thyroid not palpable, LN not appreciated   RESP: air entry b/l , No added sounds. CVS: S1 S2 heard, No murmur gallop or rub appreciated   GI: soft, non tender, no organomegaly appreciated, BS +  MS/ Ext: Joints normal, no swelling. No edema, Pulses equal.   NEURO: AAOx3, strength and sensation grossly intact.      DATA:    CBC:   Lab Results   Component Value Date    WBC 4.9 05/24/2019    RBC 4.57 05/24/2019    HGB 13.8 05/24/2019    HCT 39.1 05/24/2019    MCV 85.6 05/24/2019    MCH 30.2 05/24/2019    MCHC 35.3 05/24/2019    RDW 11.4 05/24/2019     05/24/2019    MPV 10.8 05/24/2019     CMP:    Lab Results   Component Value Date     05/25/2019    K 3.7 05/25/2019    CL 98 05/25/2019    CO2 24 05/25/2019    BUN 9 05/25/2019    CREATININE 1.0 05/25/2019    GFRAA >60 05/25/2019    LABGLOM >60 05/25/2019    GLUCOSE 97 05/25/2019    PROT 7.6 05/24/2019    LABALBU 4.9 05/24/2019    CALCIUM 5.2 05/25/2019    BILITOT 1.4 05/24/2019    ALKPHOS 122 05/24/2019    AST 43 05/24/2019 ALT 45 05/24/2019     ABG:  No results found for: PHART, CIT6MGU, PO2ART, NWC0YRX, BEART, THGBART, RPB1OSG, P0WZMXBE    IMPRESSION/RECOMMENDATIONS:      1. Hypocalcemia: s/p parathyroidectomy / low PTH   2. Hyperphosphatemia : calcitriol use? 3. Hypomagnesemia   4. Severe Vit d def     1. As of now continuing with calcium supplement IV   2. Will avoid very aggressive IV supplementation due to high phos as well   3. Add calcitriol 1 mcg po daily  4. As of now aggressive Vit D supplementation   5. Phoslo TID WM   6. Increased colace due to risk of constipation   7. Aggressive magnesium supplement   8.  Increase oral tums to 2 gm tid    Discussed with RN

## 2019-05-26 NOTE — PLAN OF CARE
Problem: Pain:  Goal: Pain level will decrease  Description  Pain level will decrease  Outcome: Met This Shift  Goal: Control of acute pain  Description  Control of acute pain  Outcome: Met This Shift     Problem: Fluid Volume:  Goal: Ability to achieve a balanced intake and output will improve  Description  Ability to achieve a balanced intake and output will improve  5/25/2019 2201 by Aleena Morrison RN  Outcome: Met This Shift  5/25/2019 1044 by Elizabeth Solorio RN  Outcome: Ongoing     Problem: Physical Regulation:  Goal: Ability to maintain clinical measurements within normal limits will improve  Description  Ability to maintain clinical measurements within normal limits will improve  Outcome: Met This Shift  Goal: Will show no signs and symptoms of electrolyte imbalance  Description  Will show no signs and symptoms of electrolyte imbalance  5/25/2019 2201 by Aleena Morrison RN  Outcome: Met This Shift  5/25/2019 1044 by Elizabeth Solorio RN  Outcome: Ongoing

## 2019-05-27 LAB
CALCIUM IONIZED: 0.89 MMOL/L (ref 1.15–1.33)
CALCIUM IONIZED: 0.96 MMOL/L (ref 1.15–1.33)
CALCIUM IONIZED: 0.97 MMOL/L (ref 1.15–1.33)
CALCIUM IONIZED: 1.01 MMOL/L (ref 1.15–1.33)
CALCIUM IONIZED: 1.04 MMOL/L (ref 1.15–1.33)
MAGNESIUM: 1.9 MG/DL (ref 1.6–2.6)
PHOSPHORUS: 7.6 MG/DL (ref 2.5–4.5)

## 2019-05-27 PROCEDURE — 6370000000 HC RX 637 (ALT 250 FOR IP): Performed by: INTERNAL MEDICINE

## 2019-05-27 PROCEDURE — 6360000002 HC RX W HCPCS: Performed by: INTERNAL MEDICINE

## 2019-05-27 PROCEDURE — 2580000003 HC RX 258: Performed by: INTERNAL MEDICINE

## 2019-05-27 PROCEDURE — 2140000000 HC CCU INTERMEDIATE R&B

## 2019-05-27 PROCEDURE — 83735 ASSAY OF MAGNESIUM: CPT

## 2019-05-27 PROCEDURE — 84100 ASSAY OF PHOSPHORUS: CPT

## 2019-05-27 PROCEDURE — 82330 ASSAY OF CALCIUM: CPT

## 2019-05-27 PROCEDURE — 36415 COLL VENOUS BLD VENIPUNCTURE: CPT

## 2019-05-27 RX ADMIN — DOCUSATE SODIUM 100 MG: 100 CAPSULE, LIQUID FILLED ORAL at 07:58

## 2019-05-27 RX ADMIN — CALCITRIOL 1 MCG: 0.25 CAPSULE ORAL at 07:58

## 2019-05-27 RX ADMIN — Medication 10 ML: at 21:45

## 2019-05-27 RX ADMIN — ENOXAPARIN SODIUM 40 MG: 40 INJECTION SUBCUTANEOUS at 07:58

## 2019-05-27 RX ADMIN — VITAMIN D, TAB 1000IU (100/BT) 5000 UNITS: 25 TAB at 07:57

## 2019-05-27 RX ADMIN — CALCIUM ACETATE 1334 MG: 667 CAPSULE ORAL at 07:58

## 2019-05-27 RX ADMIN — CALCIUM GLUCONATE: 98 INJECTION, SOLUTION INTRAVENOUS at 11:55

## 2019-05-27 RX ADMIN — CALCIUM CARBONATE 2000 MG: 500 TABLET, CHEWABLE ORAL at 15:51

## 2019-05-27 RX ADMIN — CALCIUM CARBONATE 2000 MG: 500 TABLET, CHEWABLE ORAL at 18:59

## 2019-05-27 RX ADMIN — CALCIUM ACETATE 1334 MG: 667 CAPSULE ORAL at 11:55

## 2019-05-27 RX ADMIN — PANTOPRAZOLE SODIUM 40 MG: 40 TABLET, DELAYED RELEASE ORAL at 06:33

## 2019-05-27 RX ADMIN — CALCIUM CARBONATE 2000 MG: 500 TABLET, CHEWABLE ORAL at 10:19

## 2019-05-27 ASSESSMENT — PAIN SCALES - GENERAL
PAINLEVEL_OUTOF10: 0

## 2019-05-27 NOTE — PLAN OF CARE
Problem: Pain:  Goal: Pain level will decrease  Description  Pain level will decrease  Outcome: Met This Shift     Problem: Fluid Volume:  Goal: Ability to achieve a balanced intake and output will improve  Description  Ability to achieve a balanced intake and output will improve  Outcome: Met This Shift     Problem: Physical Regulation:  Goal: Ability to maintain clinical measurements within normal limits will improve  Description  Ability to maintain clinical measurements within normal limits will improve  Outcome: Met This Shift

## 2019-05-27 NOTE — PROGRESS NOTES
64-year-old man inmate with history of thyroid cancer status post thyroidectomy, as well hypercalcemia due to primary hyperparathyroidism, who underwent recent parathyroidectomy, who was admitted on May 24, 2019 after he was brought to the ER for evaluation of body's disease and numbness. He was found to have severe hypocalcemia with ionized calcium was 0.69 and total calcium was 5.2, reason for this consultation. IMPRESSION/RECOMMENDATIONS:      1. 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 On calcium drip, calcitriol, ergocalciferol, and oral calcium; calcium levels improved    2. Hypoparathyroidism, status post surgical resection, PTH level 5  3. Hyperphosphatemia, secondary to hypoparathyroidism  4. Hypomagnesemia, probably due to poor oral intake  5.  Vitamin D deficiency, vitamin D 25 level 16, , on ergocalciferol, calcitriol    Plan:    · Continue calcium drip  · Continue calcitriol  · Continue Calcium carbonate  · Continue to monitor ionized calcium

## 2019-05-27 NOTE — PROGRESS NOTES
Hospitalist Progress Note      PCP: No primary care provider on file. 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 Nephrology consulted and pt on calcium drip, calcitriol, and oral calcium. Subjective: *Pt reports tingling has resolved       Medications:  Reviewed    Infusion Medications    IV infusion builder 40 mL/hr at 05/27/19 1155     Scheduled Medications    calcium carbonate  2,000 mg Oral TID    calcitRIOL  1 mcg Oral Daily    vitamin D  50,000 Units Oral Weekly    pantoprazole  40 mg Oral QAM AC    enoxaparin  40 mg Subcutaneous Daily    sodium chloride flush  10 mL Intravenous BID     PRN Meds: magnesium sulfate, acetaminophen, trimethobenzamide      Intake/Output Summary (Last 24 hours) at 5/27/2019 1635  Last data filed at 5/27/2019 1433  Gross per 24 hour   Intake 2847.04 ml   Output 3150 ml   Net -302.96 ml       Exam:    /81   Pulse 81   Temp 97.8 °F (36.6 °C) (Temporal)   Resp 16   Ht 6' 1\" (1.854 m)   Wt 192 lb 8 oz (87.3 kg)   SpO2 99%   BMI 25.40 kg/m²       General appearance:  No apparent distress, appears stated age and cooperative. HEENT:  Normal cephalic, atraumatic without obvious deformity. Pupils equal, round, and reactive to light.  Extra ocular muscles intact. Conjunctivae/corneas clear. Neck: Supple, with full range of motion. No jugular venous distention. Trachea midline. Surgical scar in line with previous surgery  Respiratory:  Normal respiratory effort. Clear to auscultation, bilaterally without Rales/Wheezes/Rhonchi. Cardiovascular:  Regular rate and rhythm   Abdomen: Soft, non-tender, non-distended with normal bowel sounds. Musculoskeletal:  No clubbing, cyanosis or edema bilaterally.    Skin: Skin color, texture, turgor normal.  No rashes or lesions.   Neurologic:  Neurovascularly

## 2019-05-28 LAB
ALBUMIN SERPL-MCNC: 4 G/DL (ref 3.5–5.2)
ALP BLD-CCNC: 102 U/L (ref 40–129)
ALT SERPL-CCNC: 27 U/L (ref 0–40)
ANION GAP SERPL CALCULATED.3IONS-SCNC: 14 MMOL/L (ref 7–16)
AST SERPL-CCNC: 16 U/L (ref 0–39)
BILIRUB SERPL-MCNC: 0.6 MG/DL (ref 0–1.2)
BUN BLDV-MCNC: 13 MG/DL (ref 6–20)
CALCIUM IONIZED: 0.9 MMOL/L (ref 1.15–1.33)
CALCIUM IONIZED: 0.95 MMOL/L (ref 1.15–1.33)
CALCIUM IONIZED: 1.09 MMOL/L (ref 1.15–1.33)
CALCIUM SERPL-MCNC: 6.6 MG/DL (ref 8.6–10.2)
CHLORIDE BLD-SCNC: 101 MMOL/L (ref 98–107)
CO2: 25 MMOL/L (ref 22–29)
CREAT SERPL-MCNC: 1 MG/DL (ref 0.7–1.2)
GFR AFRICAN AMERICAN: >60
GFR NON-AFRICAN AMERICAN: >60 ML/MIN/1.73
GLUCOSE BLD-MCNC: 98 MG/DL (ref 74–99)
HCT VFR BLD CALC: 36.6 % (ref 37–54)
HEMOGLOBIN: 12.7 G/DL (ref 12.5–16.5)
MAGNESIUM: 1.5 MG/DL (ref 1.6–2.6)
MCH RBC QN AUTO: 29.7 PG (ref 26–35)
MCHC RBC AUTO-ENTMCNC: 34.7 % (ref 32–34.5)
MCV RBC AUTO: 85.7 FL (ref 80–99.9)
PDW BLD-RTO: 11.6 FL (ref 11.5–15)
PHOSPHORUS: 7.6 MG/DL (ref 2.5–4.5)
PLATELET # BLD: 172 E9/L (ref 130–450)
PMV BLD AUTO: 10.6 FL (ref 7–12)
POTASSIUM SERPL-SCNC: 3.8 MMOL/L (ref 3.5–5)
RBC # BLD: 4.27 E12/L (ref 3.8–5.8)
SODIUM BLD-SCNC: 140 MMOL/L (ref 132–146)
TOTAL PROTEIN: 6.1 G/DL (ref 6.4–8.3)
WBC # BLD: 4.9 E9/L (ref 4.5–11.5)

## 2019-05-28 PROCEDURE — 6370000000 HC RX 637 (ALT 250 FOR IP): Performed by: INTERNAL MEDICINE

## 2019-05-28 PROCEDURE — 36415 COLL VENOUS BLD VENIPUNCTURE: CPT

## 2019-05-28 PROCEDURE — 82330 ASSAY OF CALCIUM: CPT

## 2019-05-28 PROCEDURE — 2580000003 HC RX 258: Performed by: INTERNAL MEDICINE

## 2019-05-28 PROCEDURE — 85027 COMPLETE CBC AUTOMATED: CPT

## 2019-05-28 PROCEDURE — 83735 ASSAY OF MAGNESIUM: CPT

## 2019-05-28 PROCEDURE — 84100 ASSAY OF PHOSPHORUS: CPT

## 2019-05-28 PROCEDURE — 80053 COMPREHEN METABOLIC PANEL: CPT

## 2019-05-28 PROCEDURE — 6360000002 HC RX W HCPCS: Performed by: INTERNAL MEDICINE

## 2019-05-28 PROCEDURE — 2140000000 HC CCU INTERMEDIATE R&B

## 2019-05-28 RX ORDER — MAGNESIUM SULFATE IN WATER 40 MG/ML
2 INJECTION, SOLUTION INTRAVENOUS ONCE
Status: DISCONTINUED | OUTPATIENT
Start: 2019-05-28 | End: 2019-05-28

## 2019-05-28 RX ORDER — LANOLIN ALCOHOL/MO/W.PET/CERES
400 CREAM (GRAM) TOPICAL DAILY
Status: DISCONTINUED | OUTPATIENT
Start: 2019-05-28 | End: 2019-06-02

## 2019-05-28 RX ADMIN — PANTOPRAZOLE SODIUM 40 MG: 40 TABLET, DELAYED RELEASE ORAL at 07:20

## 2019-05-28 RX ADMIN — CALCITRIOL 1 MCG: 0.25 CAPSULE ORAL at 10:12

## 2019-05-28 RX ADMIN — CALCIUM GLUCONATE: 98 INJECTION, SOLUTION INTRAVENOUS at 14:35

## 2019-05-28 RX ADMIN — CALCIUM CARBONATE 2000 MG: 500 TABLET, CHEWABLE ORAL at 14:35

## 2019-05-28 RX ADMIN — CALCIUM CARBONATE 2000 MG: 500 TABLET, CHEWABLE ORAL at 10:13

## 2019-05-28 RX ADMIN — Medication 10 ML: at 08:12

## 2019-05-28 RX ADMIN — MAGNESIUM SULFATE HEPTAHYDRATE 2 G: 40 INJECTION, SOLUTION INTRAVENOUS at 08:12

## 2019-05-28 RX ADMIN — ENOXAPARIN SODIUM 40 MG: 40 INJECTION SUBCUTANEOUS at 08:12

## 2019-05-28 RX ADMIN — CALCIUM CARBONATE 2000 MG: 500 TABLET, CHEWABLE ORAL at 19:06

## 2019-05-28 RX ADMIN — MAGNESIUM GLUCONATE 500 MG ORAL TABLET 400 MG: 500 TABLET ORAL at 14:34

## 2019-05-28 ASSESSMENT — PAIN SCALES - GENERAL
PAINLEVEL_OUTOF10: 0

## 2019-05-28 NOTE — PROGRESS NOTES
Hospitalist Progress Note      PCP: No primary care provider on file. 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 Nephrology consulted and pt on calcium drip, calcitriol, and oral calcium. Pt continues to have low Ca levels, Ca drip rate increased on 5/28 per nephrology. Subjective: *Pt reports tingling has resolved       Medications:  Reviewed    Infusion Medications    IV infusion builder 50 mL/hr at 05/28/19 1435     Scheduled Medications    magnesium oxide  400 mg Oral Daily    calcium carbonate  2,000 mg Oral TID    calcitRIOL  1 mcg Oral Daily    vitamin D  50,000 Units Oral Weekly    pantoprazole  40 mg Oral QAM AC    enoxaparin  40 mg Subcutaneous Daily    sodium chloride flush  10 mL Intravenous BID     PRN Meds: acetaminophen, trimethobenzamide      Intake/Output Summary (Last 24 hours) at 5/28/2019 1525  Last data filed at 5/28/2019 1436  Gross per 24 hour   Intake 2177.3 ml   Output 3375 ml   Net -1197.7 ml       Exam:    BP (!) 163/84   Pulse 89   Temp 97.9 °F (36.6 °C) (Tympanic)   Resp 17   Ht 6' 1\" (1.854 m)   Wt 192 lb 8 oz (87.3 kg)   SpO2 90%   BMI 25.40 kg/m²       General appearance:  No apparent distress, appears stated age and cooperative. HEENT:  Normal cephalic, atraumatic without obvious deformity. Pupils equal, round, and reactive to light.  Extra ocular muscles intact. Conjunctivae/corneas clear. Neck: Supple, with full range of motion. No jugular venous distention. Trachea midline. Surgical scar in line with previous surgery  Respiratory:  Normal respiratory effort. Clear to auscultation, bilaterally without Rales/Wheezes/Rhonchi. Cardiovascular:  Regular rate and rhythm   Abdomen: Soft, non-tender, non-distended with normal bowel sounds.   Musculoskeletal:  No clubbing, cyanosis or edema bilaterally.    Skin: Skin color, texture, turgor normal.  No rashes or lesions. Neurologic:  Neurovascularly intact without any focal sensory/motor deficits. Cranial nerves: II-XII intact, grossly non-focal.  Psychiatric:  Alert and oriented, thought content appropriate, normal insight  Capillary Refill: Brisk,< 3 seconds   Peripheral Pulses: +2 palpable, equal bilaterally                  Labs:   Recent Labs     19  0555   WBC 4.9   HGB 12.7   HCT 36.6*        Recent Labs     19  0608 19  0640 19  0555   NA  --   --  140   K  --   --  3.8   CL  --   --  101   CO2  --   --  25   BUN  --   --  13   CREATININE  --   --  1.0   CALCIUM  --   --  6.6*   PHOS 8.2* 7.6* 7.6*     Recent Labs     19  0555   AST 16   ALT 27   BILITOT 0.6   ALKPHOS 102     No results for input(s): INR in the last 72 hours. No results for input(s): Keyana Esther in the last 72 hours. Recent Labs     19  0555   AST 16   ALT 27   BILITOT 0.6   ALKPHOS 102     No results for input(s): LACTA in the last 72 hours.   No results found for: Lisa Si  No results found for: AMMONIA    Assessment:    Active Hospital Problems    Diagnosis Date Noted    Hypocalcemia [E83.51] 2019    Prolonged Q-T interval on ECG [R94.31] 2019    Hypomagnesemia [E83.42] 2019    H/O thyroidectomy [Z98.890] 2019    Hypocalcemia and hypomagnesemia of  [P71.1] 2019       Plan:  *Monitor labs  Replace k  Hepatitis panel   cont phoslo   Nephrology consulted; pt on calcium drip, rate increased     DVT Prophylaxis: *lovenox  Diet: No diet orders on file  Code Status: full code     PT/OT Eval Status: *na     Dispo - *back to senior living         Electronically signed by Shannan Shabazz MD on 2019 at 3:25 PM

## 2019-05-28 NOTE — PROGRESS NOTES
Department of Internal Medicine  Nephrology Attending Progress Note    Events reviewed,    SUBJECTIVE:  We are following this patient for hypocalcemia. Reports no complaints. Physical Exam:    VITALS:  /77   Pulse 89   Temp 98.3 °F (36.8 °C) (Temporal)   Resp 16   Ht 6' 1\" (1.854 m)   Wt 192 lb 8 oz (87.3 kg)   SpO2 100%   BMI 25.40 kg/m²   24HR INTAKE/OUTPUT:      Intake/Output Summary (Last 24 hours) at 5/28/2019 1026  Last data filed at 5/28/2019 0857  Gross per 24 hour   Intake 2635.04 ml   Output 2925 ml   Net -289.96 ml     GENERAL: Awake alert in no distress  EYES: no pallor, no icterus   NOSE EAR THROAT: no ulcers, no rashes, no drainage   NECK: thyroid not palpable, LN not appreciated   RESP: air entry b/l , No added sounds. CVS: S1 S2 heard, No murmur gallop or rub appreciated   GI: soft, non tender, no organomegaly appreciated, BS +  MS/ Ext: Joints normal, no swelling. No edema, Pulses equal.   NEURO: AAOx3, strength and sensation grossly intact.        DATA:    CBC:   Lab Results   Component Value Date    WBC 4.9 05/28/2019    RBC 4.27 05/28/2019    HGB 12.7 05/28/2019    HCT 36.6 05/28/2019    MCV 85.7 05/28/2019    MCH 29.7 05/28/2019    MCHC 34.7 05/28/2019    RDW 11.6 05/28/2019     05/28/2019    MPV 10.6 05/28/2019     CMP:    Lab Results   Component Value Date     05/28/2019    K 3.8 05/28/2019    K 3.7 05/25/2019     05/28/2019    CO2 25 05/28/2019    BUN 13 05/28/2019    CREATININE 1.0 05/28/2019    GFRAA >60 05/28/2019    LABGLOM >60 05/28/2019    GLUCOSE 98 05/28/2019    PROT 6.1 05/28/2019    LABALBU 4.0 05/28/2019    CALCIUM 6.6 05/28/2019    BILITOT 0.6 05/28/2019    ALKPHOS 102 05/28/2019    AST 16 05/28/2019    ALT 27 05/28/2019     Magnesium:    Lab Results   Component Value Date    MG 1.5 05/28/2019     Phosphorus:    Lab Results   Component Value Date    PHOS 7.6 05/28/2019     Radiology Review:        BRIEF SUMMARY OF INITIAL CONSULT:    Briefly Mr. Troy Banegas is a 77-year-old man inmate with history of thyroid cancer status post thyroidectomy, as well hypercalcemia due to primary hyperparathyroidism, who underwent recent parathyroidectomy, who was admitted on May 24, 2019 after he was brought to the ER for evaluation of body's disease and numbness. He was found to have severe hypocalcemia with ionized calcium was 0.69 and total calcium was 5.2, reason for this consultation. IMPRESSION/RECOMMENDATIONS:      1. 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 On calcium drip, calcitriol, ergocalciferol, and oral calcium; calcium levels continue to improve. 2. Hypoparathyroidism, status post surgical resection, PTH level 5  3. Hyperphosphatemia, secondary to hypoparathyroidism  4. Hypomagnesemia, probably due to poor oral intake  5.  Vitamin D deficiency, vitamin D 25 level 16, , on ergocalciferol, calcitriol    Plan:    · Continue calcium drip, increase to 50 mL/hour  · Continue calcitriol  · Continue Calcium carbonate  · Continue to monitor ionized calcium

## 2019-05-28 NOTE — PLAN OF CARE
Problem: Pain:  Description  Pain management should include both nonpharmacologic and pharmacologic interventions.   Goal: Pain level will decrease  Description  Pain level will decrease  5/28/2019 1814 by Nacho Jauregui RN  Outcome: Met This Shift  5/28/2019 0854 by Nacho Jauregui RN  Outcome: Met This Shift  Goal: Control of acute pain  Description  Control of acute pain  Outcome: Met This Shift     Problem: Fluid Volume:  Goal: Ability to achieve a balanced intake and output will improve  Description  Ability to achieve a balanced intake and output will improve  Outcome: Met This Shift     Problem: Physical Regulation:  Goal: Ability to maintain clinical measurements within normal limits will improve  Description  Ability to maintain clinical measurements within normal limits will improve  Outcome: Not Met This Shift  Goal: Will show no signs and symptoms of electrolyte imbalance  Description  Will show no signs and symptoms of electrolyte imbalance  Outcome: Not Met This Shift

## 2019-05-28 NOTE — PLAN OF CARE
Problem: Pain:  Description  Pain management should include both nonpharmacologic and pharmacologic interventions.   Goal: Pain level will decrease  Description  Pain level will decrease  Outcome: Met This Shift     Problem: Fluid Volume:  Goal: Ability to achieve a balanced intake and output will improve  Description  Ability to achieve a balanced intake and output will improve  Outcome: Met This Shift     Problem: Physical Regulation:  Goal: Ability to maintain clinical measurements within normal limits will improve  Description  Ability to maintain clinical measurements within normal limits will improve  Outcome: Not Met This Shift  Goal: Will show no signs and symptoms of electrolyte imbalance  Description  Will show no signs and symptoms of electrolyte imbalance  Outcome: Not Met This Shift

## 2019-05-29 LAB
ALBUMIN SERPL-MCNC: 3.9 G/DL (ref 3.5–5.2)
ALP BLD-CCNC: 103 U/L (ref 40–129)
ALT SERPL-CCNC: 27 U/L (ref 0–40)
ANION GAP SERPL CALCULATED.3IONS-SCNC: 13 MMOL/L (ref 7–16)
AST SERPL-CCNC: 16 U/L (ref 0–39)
BILIRUB SERPL-MCNC: 0.8 MG/DL (ref 0–1.2)
BUN BLDV-MCNC: 9 MG/DL (ref 6–20)
CALCIUM IONIZED: 0.97 MMOL/L (ref 1.15–1.33)
CALCIUM IONIZED: 1.02 MMOL/L (ref 1.15–1.33)
CALCIUM IONIZED: 1.04 MMOL/L (ref 1.15–1.33)
CALCIUM IONIZED: 1.07 MMOL/L (ref 1.15–1.33)
CALCIUM SERPL-MCNC: 7 MG/DL (ref 8.6–10.2)
CHLORIDE BLD-SCNC: 97 MMOL/L (ref 98–107)
CO2: 25 MMOL/L (ref 22–29)
CREAT SERPL-MCNC: 0.8 MG/DL (ref 0.7–1.2)
GFR AFRICAN AMERICAN: >60
GFR NON-AFRICAN AMERICAN: >60 ML/MIN/1.73
GLUCOSE BLD-MCNC: 94 MG/DL (ref 74–99)
HAV IGM SER IA-ACNC: NORMAL
HEPATITIS B CORE IGM ANTIBODY: NORMAL
HEPATITIS B SURFACE ANTIGEN INTERPRETATION: NORMAL
HEPATITIS C ANTIBODY INTERPRETATION: NORMAL
MAGNESIUM: 1.5 MG/DL (ref 1.6–2.6)
PHOSPHORUS: 6.9 MG/DL (ref 2.5–4.5)
POTASSIUM SERPL-SCNC: 4.1 MMOL/L (ref 3.5–5)
SODIUM BLD-SCNC: 135 MMOL/L (ref 132–146)
TOTAL PROTEIN: 6.5 G/DL (ref 6.4–8.3)

## 2019-05-29 PROCEDURE — 36415 COLL VENOUS BLD VENIPUNCTURE: CPT

## 2019-05-29 PROCEDURE — 2580000003 HC RX 258: Performed by: INTERNAL MEDICINE

## 2019-05-29 PROCEDURE — 6370000000 HC RX 637 (ALT 250 FOR IP): Performed by: INTERNAL MEDICINE

## 2019-05-29 PROCEDURE — 84100 ASSAY OF PHOSPHORUS: CPT

## 2019-05-29 PROCEDURE — 2140000000 HC CCU INTERMEDIATE R&B

## 2019-05-29 PROCEDURE — 6360000002 HC RX W HCPCS: Performed by: INTERNAL MEDICINE

## 2019-05-29 PROCEDURE — 80053 COMPREHEN METABOLIC PANEL: CPT

## 2019-05-29 PROCEDURE — 83735 ASSAY OF MAGNESIUM: CPT

## 2019-05-29 PROCEDURE — 82330 ASSAY OF CALCIUM: CPT

## 2019-05-29 RX ORDER — MAGNESIUM SULFATE IN WATER 40 MG/ML
2 INJECTION, SOLUTION INTRAVENOUS ONCE
Status: COMPLETED | OUTPATIENT
Start: 2019-05-29 | End: 2019-05-29

## 2019-05-29 RX ADMIN — ENOXAPARIN SODIUM 40 MG: 40 INJECTION SUBCUTANEOUS at 09:05

## 2019-05-29 RX ADMIN — CALCIUM GLUCONATE: 98 INJECTION, SOLUTION INTRAVENOUS at 10:29

## 2019-05-29 RX ADMIN — CALCIUM CARBONATE 2000 MG: 500 TABLET, CHEWABLE ORAL at 15:03

## 2019-05-29 RX ADMIN — CALCIUM CARBONATE 2000 MG: 500 TABLET, CHEWABLE ORAL at 10:26

## 2019-05-29 RX ADMIN — CALCIUM CARBONATE 2000 MG: 500 TABLET, CHEWABLE ORAL at 18:35

## 2019-05-29 RX ADMIN — PANTOPRAZOLE SODIUM 40 MG: 40 TABLET, DELAYED RELEASE ORAL at 05:15

## 2019-05-29 RX ADMIN — MAGNESIUM SULFATE 2 G: 2 INJECTION INTRAVENOUS at 13:51

## 2019-05-29 RX ADMIN — CALCITRIOL 1 MCG: 0.25 CAPSULE ORAL at 09:05

## 2019-05-29 RX ADMIN — MAGNESIUM GLUCONATE 500 MG ORAL TABLET 400 MG: 500 TABLET ORAL at 09:06

## 2019-05-29 ASSESSMENT — PAIN SCALES - GENERAL
PAINLEVEL_OUTOF10: 0

## 2019-05-29 NOTE — PLAN OF CARE
Problem: Pain:  Goal: Pain level will decrease  Outcome: Completed  Goal: Control of acute pain  5/29/2019 1456 by Mickey Cody RN  Outcome: Completed

## 2019-05-29 NOTE — PLAN OF CARE
Problem: Fluid Volume:  Goal: Ability to achieve a balanced intake and output will improve  Description  Ability to achieve a balanced intake and output will improve  Outcome: Met This Shift     Problem: Physical Regulation:  Goal: Ability to maintain clinical measurements within normal limits will improve  Description  Ability to maintain clinical measurements within normal limits will improve  Outcome: Met This Shift  Goal: Will show no signs and symptoms of electrolyte imbalance  Description  Will show no signs and symptoms of electrolyte imbalance  Outcome: Met This Shift

## 2019-05-29 NOTE — PROGRESS NOTES
Department of Internal Medicine  Nephrology Attending Progress Note    Events reviewed,    SUBJECTIVE:  We are following this patient for hypocalcemia. Reports no complaints. Physical Exam:    VITALS:  BP (!) 142/91   Pulse 89   Temp 97.8 °F (36.6 °C) (Temporal)   Resp 14   Ht 6' 1\" (1.854 m)   Wt 192 lb 8 oz (87.3 kg)   SpO2 98%   BMI 25.40 kg/m²   24HR INTAKE/OUTPUT:      Intake/Output Summary (Last 24 hours) at 5/29/2019 1224  Last data filed at 5/29/2019 1029  Gross per 24 hour   Intake 2081.55 ml   Output 2950 ml   Net -868.45 ml     GENERAL: Awake alert in no distress  EYES: no pallor, no icterus   NOSE EAR THROAT: no ulcers, no rashes, no drainage   NECK: thyroid not palpable, LN not appreciated   RESP: air entry b/l , No added sounds. CVS: S1 S2 heard, No murmur gallop or rub appreciated   GI: soft, non tender, no organomegaly appreciated, BS +  MS/ Ext: Joints normal, no swelling. No edema, Pulses equal.   NEURO: AAOx3, strength and sensation grossly intact.        DATA:    CBC:   Lab Results   Component Value Date    WBC 4.9 05/28/2019    RBC 4.27 05/28/2019    HGB 12.7 05/28/2019    HCT 36.6 05/28/2019    MCV 85.7 05/28/2019    MCH 29.7 05/28/2019    MCHC 34.7 05/28/2019    RDW 11.6 05/28/2019     05/28/2019    MPV 10.6 05/28/2019     CMP:    Lab Results   Component Value Date     05/29/2019    K 4.1 05/29/2019    K 3.7 05/25/2019    CL 97 05/29/2019    CO2 25 05/29/2019    BUN 9 05/29/2019    CREATININE 0.8 05/29/2019    GFRAA >60 05/29/2019    LABGLOM >60 05/29/2019    GLUCOSE 94 05/29/2019    PROT 6.5 05/29/2019    LABALBU 3.9 05/29/2019    CALCIUM 7.0 05/29/2019    BILITOT 0.8 05/29/2019    ALKPHOS 103 05/29/2019    AST 16 05/29/2019    ALT 27 05/29/2019     Magnesium:    Lab Results   Component Value Date    MG 1.5 05/29/2019     Phosphorus:    Lab Results   Component Value Date    PHOS 6.9 05/29/2019     Radiology Review:        BRIEF SUMMARY OF INITIAL CONSULT:    Briefly Mr. Rola Asencio is a 78-year-old man inmate with history of thyroid cancer status post thyroidectomy, as well hypercalcemia due to primary hyperparathyroidism, who underwent recent parathyroidectomy, who was admitted on May 24, 2019 after he was brought to the ER for evaluation of body's disease and numbness. He was found to have severe hypocalcemia with ionized calcium was 0.69 and total calcium was 5.2, reason for this consultation. IMPRESSION/RECOMMENDATIONS:      1. 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 On calcium drip, calcitriol, ergocalciferol, and oral calcium; calcium levels continue to improve. 2. Hypoparathyroidism, status post surgical resection, PTH level 5  3. Hyperphosphatemia, secondary to hypoparathyroidism  4. Hypomagnesemia, probably due to poor oral intake  5.  Vitamin D deficiency, vitamin D 25 level 16, , on ergocalciferol, calcitriol    Plan:    · Continue calcium drip, increase to 50 mL/hour   · Replace Mg  · Continue calcitriol  · Continue Calcium carbonate  · Continue to monitor ionized calcium

## 2019-05-29 NOTE — PROGRESS NOTES
Hospitalist Progress Note      PCP: No primary care provider on file. 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 Nephrology consulted and pt on calcium drip, calcitriol, and oral calcium. Pt continues to have low Ca levels, Ca drip rate increased on 5/28 per nephrology. Ca levels continue to improve     Subjective: *Pt reports tingling has resolved       Medications:  Reviewed    Infusion Medications    IV infusion builder 50 mL/hr at 05/29/19 1029     Scheduled Medications    magnesium sulfate  2 g Intravenous Once    magnesium oxide  400 mg Oral Daily    calcium carbonate  2,000 mg Oral TID    calcitRIOL  1 mcg Oral Daily    vitamin D  50,000 Units Oral Weekly    pantoprazole  40 mg Oral QAM AC    enoxaparin  40 mg Subcutaneous Daily    sodium chloride flush  10 mL Intravenous BID     PRN Meds: acetaminophen, trimethobenzamide      Intake/Output Summary (Last 24 hours) at 5/29/2019 1309  Last data filed at 5/29/2019 1029  Gross per 24 hour   Intake 2081.55 ml   Output 2950 ml   Net -868.45 ml       Exam:    BP (!) 142/91   Pulse 89   Temp 97.8 °F (36.6 °C) (Temporal)   Resp 14   Ht 6' 1\" (1.854 m)   Wt 192 lb 8 oz (87.3 kg)   SpO2 98%   BMI 25.40 kg/m²       General appearance:  No apparent distress, appears stated age and cooperative. HEENT:  Normal cephalic, atraumatic without obvious deformity. Pupils equal, round, and reactive to light.  Extra ocular muscles intact. Conjunctivae/corneas clear. Neck: Supple, with full range of motion. No jugular venous distention. Trachea midline. Surgical scar in line with previous surgery  Respiratory:  Normal respiratory effort. Clear to auscultation, bilaterally without Rales/Wheezes/Rhonchi.   Cardiovascular:  Regular rate and rhythm   Abdomen: Soft, non-tender, non-distended with normal bowel sounds. Musculoskeletal:  No clubbing, cyanosis or edema bilaterally.    Skin: Skin color, texture, turgor normal.  No rashes or lesions. Neurologic:  Neurovascularly intact without any focal sensory/motor deficits. Cranial nerves: II-XII intact, grossly non-focal.  Psychiatric:  Alert and oriented, thought content appropriate, normal insight  Capillary Refill: Brisk,< 3 seconds   Peripheral Pulses: +2 palpable, equal bilaterally                  Labs:   Recent Labs     19  05   WBC 4.9   HGB 12.7   HCT 36.6*        Recent Labs     19  0640 19  0555 19  0724   NA  --  140 135   K  --  3.8 4.1   CL  --  101 97*   CO2  --  25 25   BUN  --  13 9   CREATININE  --  1.0 0.8   CALCIUM  --  6.6* 7.0*   PHOS 7.6* 7.6* 6.9*     Recent Labs     19  0519  0724   AST 16 16   ALT 27 27   BILITOT 0.6 0.8   ALKPHOS 102 103     No results for input(s): INR in the last 72 hours. No results for input(s): Suzy Jubilee in the last 72 hours. Recent Labs     19  0555 19  0724   AST 16 16   ALT 27 27   BILITOT 0.6 0.8   ALKPHOS 102 103     No results for input(s): LACTA in the last 72 hours.   No results found for: Cindia Mylar  No results found for: AMMONIA    Assessment:    Active Hospital Problems    Diagnosis Date Noted    Hypocalcemia [E83.51] 2019    Prolonged Q-T interval on ECG [R94.31] 2019    Hypomagnesemia [E83.42] 2019    H/O thyroidectomy [Z98.890] 2019    Hypocalcemia and hypomagnesemia of  [P71.1] 2019       Plan:  *Monitor labs  Replace k   cont phoslo   Nephrology consulted; pt on calcium drip, rate increased , Ca levels improving, awaiting final recs from nephrology     DVT Prophylaxis: *lovenox  Diet: No diet orders on file  Code Status: full code     PT/OT Eval Status: *na     Dispo - *back to shelter         Electronically signed by Praveen Dugan MD on 2019 at 1:09 PM

## 2019-05-30 LAB
CALCIUM IONIZED: 1.04 MMOL/L (ref 1.15–1.33)
CALCIUM IONIZED: 1.07 MMOL/L (ref 1.15–1.33)
CALCIUM IONIZED: 1.11 MMOL/L (ref 1.15–1.33)
CALCIUM IONIZED: 1.17 MMOL/L (ref 1.15–1.33)
MAGNESIUM: 1.5 MG/DL (ref 1.6–2.6)
PHOSPHORUS: 6.8 MG/DL (ref 2.5–4.5)

## 2019-05-30 PROCEDURE — 6370000000 HC RX 637 (ALT 250 FOR IP): Performed by: INTERNAL MEDICINE

## 2019-05-30 PROCEDURE — 82330 ASSAY OF CALCIUM: CPT

## 2019-05-30 PROCEDURE — 36415 COLL VENOUS BLD VENIPUNCTURE: CPT

## 2019-05-30 PROCEDURE — 6360000002 HC RX W HCPCS: Performed by: INTERNAL MEDICINE

## 2019-05-30 PROCEDURE — 2580000003 HC RX 258: Performed by: INTERNAL MEDICINE

## 2019-05-30 PROCEDURE — 83735 ASSAY OF MAGNESIUM: CPT

## 2019-05-30 PROCEDURE — 2140000000 HC CCU INTERMEDIATE R&B

## 2019-05-30 PROCEDURE — 84100 ASSAY OF PHOSPHORUS: CPT

## 2019-05-30 RX ORDER — MAGNESIUM SULFATE IN WATER 40 MG/ML
2 INJECTION, SOLUTION INTRAVENOUS ONCE
Status: COMPLETED | OUTPATIENT
Start: 2019-05-30 | End: 2019-05-30

## 2019-05-30 RX ADMIN — CALCIUM CARBONATE 2000 MG: 500 TABLET, CHEWABLE ORAL at 10:27

## 2019-05-30 RX ADMIN — CALCIUM CARBONATE 2000 MG: 500 TABLET, CHEWABLE ORAL at 14:10

## 2019-05-30 RX ADMIN — MAGNESIUM GLUCONATE 500 MG ORAL TABLET 400 MG: 500 TABLET ORAL at 10:28

## 2019-05-30 RX ADMIN — CALCITRIOL 1 MCG: 0.25 CAPSULE ORAL at 10:28

## 2019-05-30 RX ADMIN — CALCIUM GLUCONATE: 98 INJECTION, SOLUTION INTRAVENOUS at 05:51

## 2019-05-30 RX ADMIN — PANTOPRAZOLE SODIUM 40 MG: 40 TABLET, DELAYED RELEASE ORAL at 05:51

## 2019-05-30 RX ADMIN — CALCIUM GLUCONATE: 98 INJECTION, SOLUTION INTRAVENOUS at 23:57

## 2019-05-30 RX ADMIN — MAGNESIUM SULFATE 2 G: 2 INJECTION INTRAVENOUS at 10:27

## 2019-05-30 RX ADMIN — CALCIUM CARBONATE 2000 MG: 500 TABLET, CHEWABLE ORAL at 20:45

## 2019-05-30 RX ADMIN — ENOXAPARIN SODIUM 40 MG: 40 INJECTION SUBCUTANEOUS at 10:28

## 2019-05-30 ASSESSMENT — PAIN SCALES - GENERAL
PAINLEVEL_OUTOF10: 0

## 2019-05-30 NOTE — PLAN OF CARE
Problem: Fluid Volume:  Goal: Ability to achieve a balanced intake and output will improve  Description  Ability to achieve a balanced intake and output will improve  5/30/2019 1604 by Cortez Mazariegos RN  Outcome: Met This Shift     Problem: Physical Regulation:  Goal: Ability to maintain clinical measurements within normal limits will improve  Description  Ability to maintain clinical measurements within normal limits will improve  5/30/2019 1604 by Cortez Mazariegos RN  Outcome: Met This Shift     Problem: Physical Regulation:  Goal: Will show no signs and symptoms of electrolyte imbalance  Description  Will show no signs and symptoms of electrolyte imbalance  Outcome: Ongoing

## 2019-05-30 NOTE — PROGRESS NOTES
Department of Internal Medicine  Nephrology Attending Progress Note    Events reviewed,    SUBJECTIVE:  We are following this patient for hypocalcemia. Reports no complaints. Physical Exam:    VITALS:  /71   Pulse 95   Temp 98.7 °F (37.1 °C) (Temporal)   Resp 18   Ht 6' 1\" (1.854 m)   Wt 192 lb 8 oz (87.3 kg)   SpO2 97%   BMI 25.40 kg/m²   24HR INTAKE/OUTPUT:      Intake/Output Summary (Last 24 hours) at 5/30/2019 1355  Last data filed at 5/30/2019 0603  Gross per 24 hour   Intake 1665.6 ml   Output 1400 ml   Net 265.6 ml     GENERAL: Awake alert in no distress  EYES: no pallor, no icterus   NOSE EAR THROAT: no ulcers, no rashes, no drainage   NECK: thyroid not palpable, LN not appreciated   RESP: air entry b/l , No added sounds. CVS: S1 S2 heard, No murmur gallop or rub appreciated   GI: soft, non tender, no organomegaly appreciated, BS +  MS/ Ext: Joints normal, no swelling. No edema, Pulses equal.   NEURO: AAOx3, strength and sensation grossly intact.        DATA:    CBC:   Lab Results   Component Value Date    WBC 4.9 05/28/2019    RBC 4.27 05/28/2019    HGB 12.7 05/28/2019    HCT 36.6 05/28/2019    MCV 85.7 05/28/2019    MCH 29.7 05/28/2019    MCHC 34.7 05/28/2019    RDW 11.6 05/28/2019     05/28/2019    MPV 10.6 05/28/2019     CMP:    Lab Results   Component Value Date     05/29/2019    K 4.1 05/29/2019    K 3.7 05/25/2019    CL 97 05/29/2019    CO2 25 05/29/2019    BUN 9 05/29/2019    CREATININE 0.8 05/29/2019    GFRAA >60 05/29/2019    LABGLOM >60 05/29/2019    GLUCOSE 94 05/29/2019    PROT 6.5 05/29/2019    LABALBU 3.9 05/29/2019    CALCIUM 7.0 05/29/2019    BILITOT 0.8 05/29/2019    ALKPHOS 103 05/29/2019    AST 16 05/29/2019    ALT 27 05/29/2019     Magnesium:    Lab Results   Component Value Date    MG 1.5 05/30/2019     Phosphorus:    Lab Results   Component Value Date    PHOS 6.8 05/30/2019     Radiology Review:        BRIEF SUMMARY OF INITIAL CONSULT:    Briefly . Ann Dalal is a 22-year-old man inmate with history of thyroid cancer status post thyroidectomy, as well hypercalcemia due to primary hyperparathyroidism, who underwent recent parathyroidectomy, who was admitted on May 24, 2019 after he was brought to the ER for evaluation of body's disease and numbness. He was found to have severe hypocalcemia with ionized calcium was 0.69 and total calcium was 5.2, reason for this consultation. IMPRESSION/RECOMMENDATIONS:      1. 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 On calcium drip, calcitriol, ergocalciferol, and oral calcium; calcium levels continue to improve. 2. Hypoparathyroidism, status post surgical resection, PTH level 5  3. Hyperphosphatemia, secondary to hypoparathyroidism  4. Hypomagnesemia, probably due to poor oral intake and   5.  Vitamin D deficiency, vitamin D 25 level 16, , on ergocalciferol, calcitriol    Plan:    · Increase calcium drip, increase to 60 mL/hour   · Replace Mg  · Continue calcitriol  · Continue Calcium carbonate  · Continue to monitor ionized calcium

## 2019-05-30 NOTE — PROGRESS NOTES
clubbing, cyanosis or edema bilaterally.    Skin: Skin color, texture, turgor normal.  No rashes or lesions. Neurologic:  Neurovascularly intact without any focal sensory/motor deficits. Cranial nerves: II-XII intact, grossly non-focal.  Psychiatric:  Alert and oriented, thought content appropriate, normal insight  Capillary Refill: Brisk,< 3 seconds   Peripheral Pulses: +2 palpable, equal bilaterally                  Labs:   Recent Labs     19   WBC 4.9   HGB 12.7   HCT 36.6*        Recent Labs     19  0724 19  0454    135  --    K 3.8 4.1  --     97*  --    CO2 25 25  --    BUN 13 9  --    CREATININE 1.0 0.8  --    CALCIUM 6.6* 7.0*  --    PHOS 7.6* 6.9* 6.8*     Recent Labs     19  0724   AST 16 16   ALT 27 27   BILITOT 0.6 0.8   ALKPHOS 102 103     No results for input(s): INR in the last 72 hours. No results for input(s): Willia Beverage in the last 72 hours. Recent Labs     19  0724   AST 16 16   ALT 27 27   BILITOT 0.6 0.8   ALKPHOS 102 103     No results for input(s): LACTA in the last 72 hours.   No results found for: Bert Varghese  No results found for: AMMONIA    Assessment:    Active Hospital Problems    Diagnosis Date Noted    Hypocalcemia [E83.51] 2019    Prolonged Q-T interval on ECG [R94.31] 2019    Hypomagnesemia [E83.42] 2019    H/O thyroidectomy [Z98.890] 2019    Hypocalcemia and hypomagnesemia of  [P71.1] 2019       Plan:  *Monitor labs  Replace k   cont phoslo   Nephrology consulted; pt on calcium drip, rate increased , Ca levels improving, awaiting final recs from nephrology     DVT Prophylaxis: *lovenox  Diet: No diet orders on file  Code Status: full code     PT/OT Eval Status: *na     [facility]         Electronically signed by Katt Crandall MD on 2019 at 4:50 PM

## 2019-05-30 NOTE — PLAN OF CARE
Problem: Fluid Volume:  Goal: Ability to achieve a balanced intake and output will improve  Outcome: Met This Shift     Problem: Physical Regulation:  Goal: Ability to maintain clinical measurements within normal limits will improve  Outcome: Not Met This Shift

## 2019-05-31 LAB
ALBUMIN SERPL-MCNC: 4 G/DL (ref 3.5–5.2)
ALP BLD-CCNC: 96 U/L (ref 40–129)
ALT SERPL-CCNC: 23 U/L (ref 0–40)
ANION GAP SERPL CALCULATED.3IONS-SCNC: 15 MMOL/L (ref 7–16)
AST SERPL-CCNC: 15 U/L (ref 0–39)
BILIRUB SERPL-MCNC: 0.7 MG/DL (ref 0–1.2)
BUN BLDV-MCNC: 9 MG/DL (ref 6–20)
CALCIUM IONIZED: 1.09 MMOL/L (ref 1.15–1.33)
CALCIUM IONIZED: 1.16 MMOL/L (ref 1.15–1.33)
CALCIUM IONIZED: 1.16 MMOL/L (ref 1.15–1.33)
CALCIUM IONIZED: 1.19 MMOL/L (ref 1.15–1.33)
CALCIUM SERPL-MCNC: 7.8 MG/DL (ref 8.6–10.2)
CHLORIDE BLD-SCNC: 98 MMOL/L (ref 98–107)
CO2: 25 MMOL/L (ref 22–29)
CREAT SERPL-MCNC: 0.9 MG/DL (ref 0.7–1.2)
GFR AFRICAN AMERICAN: >60
GFR NON-AFRICAN AMERICAN: >60 ML/MIN/1.73
GLUCOSE BLD-MCNC: 92 MG/DL (ref 74–99)
MAGNESIUM: 1.6 MG/DL (ref 1.6–2.6)
PHOSPHORUS: 6 MG/DL (ref 2.5–4.5)
POTASSIUM SERPL-SCNC: 3.9 MMOL/L (ref 3.5–5)
SODIUM BLD-SCNC: 138 MMOL/L (ref 132–146)
TOTAL PROTEIN: 6.5 G/DL (ref 6.4–8.3)

## 2019-05-31 PROCEDURE — 82330 ASSAY OF CALCIUM: CPT

## 2019-05-31 PROCEDURE — 83735 ASSAY OF MAGNESIUM: CPT

## 2019-05-31 PROCEDURE — 36415 COLL VENOUS BLD VENIPUNCTURE: CPT

## 2019-05-31 PROCEDURE — 2580000003 HC RX 258: Performed by: INTERNAL MEDICINE

## 2019-05-31 PROCEDURE — 2140000000 HC CCU INTERMEDIATE R&B

## 2019-05-31 PROCEDURE — 84100 ASSAY OF PHOSPHORUS: CPT

## 2019-05-31 PROCEDURE — 6370000000 HC RX 637 (ALT 250 FOR IP): Performed by: INTERNAL MEDICINE

## 2019-05-31 PROCEDURE — 80053 COMPREHEN METABOLIC PANEL: CPT

## 2019-05-31 PROCEDURE — 6360000002 HC RX W HCPCS: Performed by: INTERNAL MEDICINE

## 2019-05-31 RX ORDER — MAGNESIUM SULFATE 1 G/100ML
1 INJECTION INTRAVENOUS ONCE
Status: COMPLETED | OUTPATIENT
Start: 2019-05-31 | End: 2019-05-31

## 2019-05-31 RX ORDER — HYDROCODONE BITARTRATE AND ACETAMINOPHEN 5; 325 MG/1; MG/1
1 TABLET ORAL EVERY 6 HOURS PRN
Status: DISCONTINUED | OUTPATIENT
Start: 2019-05-31 | End: 2019-06-07 | Stop reason: HOSPADM

## 2019-05-31 RX ADMIN — CALCITRIOL 1 MCG: 0.25 CAPSULE ORAL at 09:27

## 2019-05-31 RX ADMIN — PANTOPRAZOLE SODIUM 40 MG: 40 TABLET, DELAYED RELEASE ORAL at 06:25

## 2019-05-31 RX ADMIN — CALCIUM CARBONATE 2000 MG: 500 TABLET, CHEWABLE ORAL at 15:00

## 2019-05-31 RX ADMIN — CALCIUM CARBONATE 2000 MG: 500 TABLET, CHEWABLE ORAL at 20:54

## 2019-05-31 RX ADMIN — MAGNESIUM SULFATE HEPTAHYDRATE 1 G: 1 INJECTION, SOLUTION INTRAVENOUS at 10:01

## 2019-05-31 RX ADMIN — CALCIUM CARBONATE 2000 MG: 500 TABLET, CHEWABLE ORAL at 09:28

## 2019-05-31 RX ADMIN — MAGNESIUM GLUCONATE 500 MG ORAL TABLET 400 MG: 500 TABLET ORAL at 09:27

## 2019-05-31 RX ADMIN — ENOXAPARIN SODIUM 40 MG: 40 INJECTION SUBCUTANEOUS at 09:27

## 2019-05-31 RX ADMIN — ACETAMINOPHEN 650 MG: 325 TABLET, FILM COATED ORAL at 23:08

## 2019-05-31 RX ADMIN — CALCIUM GLUCONATE: 98 INJECTION, SOLUTION INTRAVENOUS at 15:00

## 2019-05-31 ASSESSMENT — PAIN SCALES - GENERAL
PAINLEVEL_OUTOF10: 0
PAINLEVEL_OUTOF10: 3

## 2019-05-31 NOTE — PROGRESS NOTES
Nutrition Assessment    Type and Reason for Visit: Initial(LOS)    Nutrition Recommendations: Continue current diet    Nutrition Assessment: Pt adequately nourished on admit AEB good p.o and stable wt however at risk for nutritional risk d/t hypocalcemia 2/2 hyperparathyroidism. Will continue to monitor nutrition progression. Malnutrition Assessment:  · Malnutrition Status: No malnutrition  · Context: Acute illness or injury  · Findings of the 6 clinical characteristics of malnutrition (Minimum of 2 out of 6 clinical characteristics is required to make the diagnosis of moderate or severe Protein Calorie Malnutrition based on AND/ASPEN Guidelines):  1. Energy Intake-Greater than 75% of estimated energy requirement, Greater than or equal to 7 days    2. Weight Loss-No significant weight loss  3. Fat Loss-No significant subcutaneous fat loss                                                             Muscle Loss-No significant muscle mass loss  4. Fluid Accumulation-No significant fluid accumulation  5.   Strength-Not measured    Nutrition Risk Level: Low    Nutrient Needs:  · Estimated Daily Total Kcal: (MSJ REE 1932 x1.2 SF)  · Estimated Daily Protein (g): 105-115g(1.2-1.4)  · Estimated Daily Total Fluid (ml/day): (1ml/kcal)    Nutrition Diagnosis:   · Problem: Altered nutrition-related lab values  · Etiology: related to Increased demand for energy/nutrients     Signs and symptoms:  as evidenced by Lab values    Objective Information:  · Nutrition-Focused Physical Findings: A & Ox4. -I/O's, Abd WDL active BS, Hx: thyroid cancer     · Wound Type: None     · Current Nutrition Therapies:  · Oral Diet Orders: General(Low Phosphorous)   · Oral Diet intake: %     Anthropometric Measures:  · Ht: 6' 1\" (185.4 cm)   · Current Body Wt: 192 lb (87.1 kg)(Standing 5/24)  · Admission Body Wt: 192 lb (87.1 kg)(Standing 5/24, UTO new weight, )  · Usual Body Wt: 190 lb (86.2 kg)(Stated)  · Ideal Body Wt: 184 lb (83.5 kg), % Ideal Body 104%  · BMI Classification: BMI 25.0 - 29.9 Overweight    Nutrition Interventions:   Continue current diet  Continued Inpatient Monitoring, Education Initiated, Coordination of Care    Nutrition Evaluation:   · Evaluation: Goals set   · Goals: Continue po intake >75%    · Monitoring: Meal Intake, Diet Tolerance, I&O, Skin Integrity, Weight, Pertinent Labs      Electronically signed by Aleyda Mccray RD, LD on 5/31/19 at 10:54 AM    Contact Number: [phone]

## 2019-05-31 NOTE — PROGRESS NOTES
Department of Internal Medicine  Nephrology Attending Progress Note    Events reviewed,    SUBJECTIVE:  We are following this patient for hypocalcemia. Reports no complaints. Physical Exam:    VITALS:  /69   Pulse 90   Temp 98.7 °F (37.1 °C) (Oral)   Resp 16   Ht 6' 1\" (1.854 m)   Wt 192 lb 8 oz (87.3 kg)   SpO2 99%   BMI 25.40 kg/m²   24HR INTAKE/OUTPUT:      Intake/Output Summary (Last 24 hours) at 5/31/2019 0925  Last data filed at 5/31/2019 2309  Gross per 24 hour   Intake 2590.94 ml   Output 3350 ml   Net -759.06 ml     GENERAL: Awake alert in no distress  EYES: no pallor, no icterus   NOSE EAR THROAT: no ulcers, no rashes, no drainage   NECK: thyroid not palpable, LN not appreciated   RESP: air entry b/l , No added sounds. CVS: S1 S2 heard, No murmur gallop or rub appreciated   GI: soft, non tender, no organomegaly appreciated, BS +  MS/ Ext: Joints normal, no swelling. No edema, Pulses equal.   NEURO: AAOx3, strength and sensation grossly intact.        DATA:    CBC:   Lab Results   Component Value Date    WBC 4.9 05/28/2019    RBC 4.27 05/28/2019    HGB 12.7 05/28/2019    HCT 36.6 05/28/2019    MCV 85.7 05/28/2019    MCH 29.7 05/28/2019    MCHC 34.7 05/28/2019    RDW 11.6 05/28/2019     05/28/2019    MPV 10.6 05/28/2019     CMP:    Lab Results   Component Value Date     05/31/2019    K 3.9 05/31/2019    K 3.7 05/25/2019    CL 98 05/31/2019    CO2 25 05/31/2019    BUN 9 05/31/2019    CREATININE 0.9 05/31/2019    GFRAA >60 05/31/2019    LABGLOM >60 05/31/2019    GLUCOSE 92 05/31/2019    PROT 6.5 05/31/2019    LABALBU 4.0 05/31/2019    CALCIUM 7.8 05/31/2019    BILITOT 0.7 05/31/2019    ALKPHOS 96 05/31/2019    AST 15 05/31/2019    ALT 23 05/31/2019     Magnesium:    Lab Results   Component Value Date    MG 1.6 05/31/2019     Phosphorus:    Lab Results   Component Value Date    PHOS 6.0 05/31/2019     Vitamin D 25:16 (low)    Radiology Review:        BRIEF SUMMARY OF INITIAL CONSULT:    Briefly Mr. Jameson López is a 68-year-old man inmate with history of thyroid cancer status post thyroidectomy, as well hypercalcemia due to primary hyperparathyroidism, who underwent recent parathyroidectomy, who was admitted on May 24, 2019 after he was brought to the ER for evaluation of body's disease and numbness. He was found to have severe hypocalcemia with ionized calcium was 0.69 and total calcium was 5.2, reason for this consultation. IMPRESSION/RECOMMENDATIONS:      1. 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 On calcium drip, calcitriol, ergocalciferol, and oral calcium; calcium levels continue to improve. 2. Hypoparathyroidism, status post surgical resection, PTH level 5  3. Hyperphosphatemia, secondary to hypoparathyroidism  4. Hypomagnesemia, probably due to poor oral intake  5.  Vitamin D deficiency, vitamin D 25 level 16, , on ergocalciferol, calcitriol    Plan:    · Increase calcium drip, increase to 70 mL/hour   · Replace Mg  · Continue calcitriol on MCG  · Continue ergocalciferol 50,000 units weekly  · Continue Calcium carbonate 2 g 3 times a day  · Continue magnesium oxide 400 mg daily  · Continue to monitor ionized calcium

## 2019-05-31 NOTE — PROGRESS NOTES
clubbing, cyanosis or edema bilaterally.    Skin: Skin color, texture, turgor normal.  No rashes or lesions. Neurologic:  Neurovascularly intact without any focal sensory/motor deficits. Cranial nerves: II-XII intact, grossly non-focal.  Psychiatric:  Alert and oriented, thought content appropriate, normal insight  Capillary Refill: Brisk,< 3 seconds   Peripheral Pulses: +2 palpable, equal bilaterally                  Labs:   No results for input(s): WBC, HGB, HCT, PLT in the last 72 hours. Recent Labs     19  0454 19  0746     --  138   K 4.1  --  3.9   CL 97*  --  98   CO2 25  --  25   BUN 9  --  9   CREATININE 0.8  --  0.9   CALCIUM 7.0*  --  7.8*   PHOS 6.9* 6.8* 6.0*     Recent Labs     19  0724 19  0746   AST 16 15   ALT 27 23   BILITOT 0.8 0.7   ALKPHOS 103 96     No results for input(s): INR in the last 72 hours. No results for input(s): Carlso Jenifer in the last 72 hours. Recent Labs     19  0724 19  0746   AST 16 15   ALT 27 23   BILITOT 0.8 0.7   ALKPHOS 103 96     No results for input(s): LACTA in the last 72 hours.   No results found for: Duckworth Favsamy  No results found for: AMMONIA    Assessment:    Active Hospital Problems    Diagnosis Date Noted    Hypocalcemia [E83.51] 2019    Prolonged Q-T interval on ECG [R94.31] 2019    Hypomagnesemia [E83.42] 2019    H/O thyroidectomy [Z98.890] 2019    Hypocalcemia and hypomagnesemia of  [P71.1] 2019       Plan:  *Monitor labs  Replace k   cont phoslo   Nephrology consulted; pt on calcium drip, rate increased, Ca levels improving    DVT Prophylaxis: *lovenox  Diet: No diet orders on file  Code Status: full code     PT/OT Eval Status: *na     Dispo - *back to [facility]         Electronically signed by Anne Martinez MD on 2019 at 2:48 PM

## 2019-06-01 LAB
ANION GAP SERPL CALCULATED.3IONS-SCNC: 12 MMOL/L (ref 7–16)
BUN BLDV-MCNC: 10 MG/DL (ref 6–20)
CALCIUM IONIZED: 1.04 MMOL/L (ref 1.15–1.33)
CALCIUM IONIZED: 1.17 MMOL/L (ref 1.15–1.33)
CALCIUM IONIZED: 1.2 MMOL/L (ref 1.15–1.33)
CALCIUM IONIZED: 1.24 MMOL/L (ref 1.15–1.33)
CALCIUM SERPL-MCNC: 8.5 MG/DL (ref 8.6–10.2)
CHLORIDE BLD-SCNC: 101 MMOL/L (ref 98–107)
CO2: 27 MMOL/L (ref 22–29)
CREAT SERPL-MCNC: 0.9 MG/DL (ref 0.7–1.2)
GFR AFRICAN AMERICAN: >60
GFR NON-AFRICAN AMERICAN: >60 ML/MIN/1.73
GLUCOSE BLD-MCNC: 123 MG/DL (ref 74–99)
HCT VFR BLD CALC: 36.7 % (ref 37–54)
HEMOGLOBIN: 12.7 G/DL (ref 12.5–16.5)
MAGNESIUM: 1.5 MG/DL (ref 1.6–2.6)
MCH RBC QN AUTO: 29.8 PG (ref 26–35)
MCHC RBC AUTO-ENTMCNC: 34.6 % (ref 32–34.5)
MCV RBC AUTO: 86.2 FL (ref 80–99.9)
PDW BLD-RTO: 11.8 FL (ref 11.5–15)
PHOSPHORUS: 4 MG/DL (ref 2.5–4.5)
PLATELET # BLD: 184 E9/L (ref 130–450)
PMV BLD AUTO: 11.1 FL (ref 7–12)
POTASSIUM SERPL-SCNC: 3.8 MMOL/L (ref 3.5–5)
RBC # BLD: 4.26 E12/L (ref 3.8–5.8)
SODIUM BLD-SCNC: 140 MMOL/L (ref 132–146)
WBC # BLD: 7.7 E9/L (ref 4.5–11.5)

## 2019-06-01 PROCEDURE — 6370000000 HC RX 637 (ALT 250 FOR IP): Performed by: INTERNAL MEDICINE

## 2019-06-01 PROCEDURE — 82330 ASSAY OF CALCIUM: CPT

## 2019-06-01 PROCEDURE — 6360000002 HC RX W HCPCS: Performed by: INTERNAL MEDICINE

## 2019-06-01 PROCEDURE — 2140000000 HC CCU INTERMEDIATE R&B

## 2019-06-01 PROCEDURE — 83735 ASSAY OF MAGNESIUM: CPT

## 2019-06-01 PROCEDURE — 36415 COLL VENOUS BLD VENIPUNCTURE: CPT

## 2019-06-01 PROCEDURE — 80048 BASIC METABOLIC PNL TOTAL CA: CPT

## 2019-06-01 PROCEDURE — 2580000003 HC RX 258: Performed by: INTERNAL MEDICINE

## 2019-06-01 PROCEDURE — 84100 ASSAY OF PHOSPHORUS: CPT

## 2019-06-01 PROCEDURE — 85027 COMPLETE CBC AUTOMATED: CPT

## 2019-06-01 RX ORDER — MAGNESIUM SULFATE IN WATER 40 MG/ML
2 INJECTION, SOLUTION INTRAVENOUS ONCE
Status: COMPLETED | OUTPATIENT
Start: 2019-06-01 | End: 2019-06-01

## 2019-06-01 RX ADMIN — CALCIUM CARBONATE 2000 MG: 500 TABLET, CHEWABLE ORAL at 16:21

## 2019-06-01 RX ADMIN — ENOXAPARIN SODIUM 40 MG: 40 INJECTION SUBCUTANEOUS at 10:22

## 2019-06-01 RX ADMIN — MAGNESIUM SULFATE 2 G: 2 INJECTION INTRAVENOUS at 13:27

## 2019-06-01 RX ADMIN — ACETAMINOPHEN 650 MG: 325 TABLET, FILM COATED ORAL at 05:16

## 2019-06-01 RX ADMIN — CALCITRIOL 1 MCG: 0.25 CAPSULE ORAL at 10:22

## 2019-06-01 RX ADMIN — PANTOPRAZOLE SODIUM 40 MG: 40 TABLET, DELAYED RELEASE ORAL at 05:16

## 2019-06-01 RX ADMIN — CALCIUM CARBONATE 2000 MG: 500 TABLET, CHEWABLE ORAL at 10:22

## 2019-06-01 RX ADMIN — MAGNESIUM GLUCONATE 500 MG ORAL TABLET 400 MG: 500 TABLET ORAL at 10:22

## 2019-06-01 RX ADMIN — CALCIUM CARBONATE 2000 MG: 500 TABLET, CHEWABLE ORAL at 20:21

## 2019-06-01 RX ADMIN — CALCIUM GLUCONATE: 98 INJECTION, SOLUTION INTRAVENOUS at 06:47

## 2019-06-01 ASSESSMENT — PAIN SCALES - GENERAL
PAINLEVEL_OUTOF10: 0
PAINLEVEL_OUTOF10: 0
PAINLEVEL_OUTOF10: 3

## 2019-06-01 NOTE — PROGRESS NOTES
Department of Internal Medicine  Nephrology Attending Progress Note    Events reviewed,    SUBJECTIVE:  We are following this patient for hypocalcemia. Reports no complaints. Physical Exam:    VITALS:  BP (!) 168/98   Pulse 104   Temp 97.3 °F (36.3 °C) (Temporal)   Resp 18   Ht 6' 1\" (1.854 m)   Wt 192 lb 8 oz (87.3 kg)   SpO2 99%   BMI 25.40 kg/m²   24HR INTAKE/OUTPUT:      Intake/Output Summary (Last 24 hours) at 6/1/2019 1232  Last data filed at 6/1/2019 0830  Gross per 24 hour   Intake 3314.81 ml   Output 2825 ml   Net 489.81 ml     GENERAL: Awake alert in no distress  EYES: no pallor, no icterus   NOSE EAR THROAT: no ulcers, no rashes, no drainage   NECK: thyroid not palpable, LN not appreciated   RESP: air entry b/l , No added sounds. CVS: S1 S2 heard, No murmur gallop or rub appreciated   GI: soft, non tender, no organomegaly appreciated, BS +  MS/ Ext: Joints normal, no swelling. No edema, Pulses equal.   NEURO: AAOx3, strength and sensation grossly intact.        DATA:    CBC:   Lab Results   Component Value Date    WBC 7.7 06/01/2019    RBC 4.26 06/01/2019    HGB 12.7 06/01/2019    HCT 36.7 06/01/2019    MCV 86.2 06/01/2019    MCH 29.8 06/01/2019    MCHC 34.6 06/01/2019    RDW 11.8 06/01/2019     06/01/2019    MPV 11.1 06/01/2019     CMP:    Lab Results   Component Value Date     06/01/2019    K 3.8 06/01/2019    K 3.7 05/25/2019     06/01/2019    CO2 27 06/01/2019    BUN 10 06/01/2019    CREATININE 0.9 06/01/2019    GFRAA >60 06/01/2019    LABGLOM >60 06/01/2019    GLUCOSE 123 06/01/2019    PROT 6.5 05/31/2019    LABALBU 4.0 05/31/2019    CALCIUM 8.5 06/01/2019    BILITOT 0.7 05/31/2019    ALKPHOS 96 05/31/2019    AST 15 05/31/2019    ALT 23 05/31/2019     Magnesium:    Lab Results   Component Value Date    MG 1.5 06/01/2019     Phosphorus:    Lab Results   Component Value Date    PHOS 4.0 06/01/2019     Vitamin D 25:16 (low)    Radiology Review:        BRIEF SUMMARY OF INITIAL CONSULT:    Briefly Mr. Froilan Fischer is a 64-year-old man inmate with history of thyroid cancer status post thyroidectomy, as well hypercalcemia due to primary hyperparathyroidism, who underwent recent parathyroidectomy, who was admitted on May 24, 2019 after he was brought to the ER for evaluation of body's disease and numbness. He was found to have severe hypocalcemia with ionized calcium was 0.69 and total calcium was 5.2, reason for this consultation. IMPRESSION/RECOMMENDATIONS:      1. 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 On , calcitriol, ergocalciferol, and oral calcium. · Ionized calcium levels corrected. We will discontinue current monitor calcium levels. 2. Hypoparathyroidism, status post surgical resection, PTH level 5  3. Hyperphosphatemia, secondary to hypoparathyroidism  4. Hypomagnesemia, probably due to poor oral intake  5.  Vitamin D deficiency, vitamin D 25 level 16, , on ergocalciferol, calcitriol    Plan:    · Discontinue conscience trip  · Replace Mg  · Continue calcitriol  · Continue ergocalciferol 50,000 units weekly  · Continue Calcium carbonate 2 g 3 times a day  · Continue magnesium oxide 400 mg daily  · Continue to monitor ionized calcium every 6 hours

## 2019-06-01 NOTE — PROGRESS NOTES
Hospitalist Progress Note      PCP: No primary care provider on file. 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 Nephrology consulted and pt on calcium drip, calcitriol, and oral calcium. Pt continues to have low Ca levels, Ca drip discontinued. Ca levels continue to improve     Subjective: *Pt reports tingling has resolved       Medications:  Reviewed    Infusion Medications    IV infusion builder 70 mL/hr at 06/01/19 0647     Scheduled Medications    magnesium oxide  400 mg Oral Daily    calcium carbonate  2,000 mg Oral TID    calcitRIOL  1 mcg Oral Daily    vitamin D  50,000 Units Oral Weekly    pantoprazole  40 mg Oral QAM AC    enoxaparin  40 mg Subcutaneous Daily    sodium chloride flush  10 mL Intravenous BID     PRN Meds: HYDROcodone 5 mg - acetaminophen, acetaminophen, trimethobenzamide      Intake/Output Summary (Last 24 hours) at 6/1/2019 1623  Last data filed at 6/1/2019 1457  Gross per 24 hour   Intake 2647.81 ml   Output 2425 ml   Net 222.81 ml       Exam:    /74   Pulse 118   Temp 98.9 °F (37.2 °C) (Temporal)   Resp 18   Ht 6' 1\" (1.854 m)   Wt 192 lb 8 oz (87.3 kg)   SpO2 98%   BMI 25.40 kg/m²       General appearance:  No apparent distress, appears stated age and cooperative. HEENT:  Normal cephalic, atraumatic without obvious deformity. Pupils equal, round, and reactive to light.  Extra ocular muscles intact. Conjunctivae/corneas clear. Neck: Supple, with full range of motion. No jugular venous distention. Trachea midline. Surgical scar in line with previous surgery  Respiratory:  Normal respiratory effort. Clear to auscultation, bilaterally without Rales/Wheezes/Rhonchi. Cardiovascular:  Regular rate and rhythm   Abdomen: Soft, non-tender, non-distended with normal bowel sounds.   Musculoskeletal:  No clubbing, cyanosis or edema bilaterally.    Skin: Skin color, texture, turgor normal.  No rashes or lesions. Mild L arm swelling (IV site had infiltrated)  Neurologic:  Neurovascularly intact without any focal sensory/motor deficits. Cranial nerves: II-XII intact, grossly non-focal.  Psychiatric:  Alert and oriented, thought content appropriate, normal insight  Capillary Refill: Brisk,< 3 seconds   Peripheral Pulses: +2 palpable, equal bilaterally                  Labs:   Recent Labs     19  0935   WBC 7.7   HGB 12.7   HCT 36.7*        Recent Labs     19  0454 19  0746 19  0934   NA  --  138 140   K  --  3.9 3.8   CL  --  98 101   CO2  --  25 27   BUN  --  9 10   CREATININE  --  0.9 0.9   CALCIUM  --  7.8* 8.5*   PHOS 6.8* 6.0* 4.0     Recent Labs     19  0746   AST 15   ALT 23   BILITOT 0.7   ALKPHOS 96     No results for input(s): INR in the last 72 hours. No results for input(s): Jose Armando Relic in the last 72 hours. Recent Labs     19  0746   AST 15   ALT 23   BILITOT 0.7   ALKPHOS 96     No results for input(s): LACTA in the last 72 hours. No results found for: Wyelena Goodrichaine  No results found for: AMMONIA    Assessment:    Active Hospital Problems    Diagnosis Date Noted    Hypocalcemia [E83.51] 2019    Prolonged Q-T interval on ECG [R94.31] 2019    Hypomagnesemia [E83.42] 2019    H/O thyroidectomy [Z98.890] 2019    Hypocalcemia and hypomagnesemia of  [P71.1] 2019       Plan:  *Monitor labs  Replace k   cont phoslo   Nephrology consulted;   Ca level improving and drip discontinued, continue to monitor Ca levels     DVT Prophylaxis: *lovenox  Diet: No diet orders on file  Code Status: full code     PT/OT Eval Status: *na     Dispo - *back to senior care         Electronically signed by Yandel Apodaca MD on 2019 at 4:23 PM

## 2019-06-02 LAB
CALCIUM IONIZED: 0.93 MMOL/L (ref 1.15–1.33)
CALCIUM IONIZED: 1 MMOL/L (ref 1.15–1.33)
CALCIUM IONIZED: 1.01 MMOL/L (ref 1.15–1.33)
CALCIUM IONIZED: 1.02 MMOL/L (ref 1.15–1.33)
MAGNESIUM: 1.4 MG/DL (ref 1.6–2.6)
PHOSPHORUS: 5.8 MG/DL (ref 2.5–4.5)

## 2019-06-02 PROCEDURE — 36415 COLL VENOUS BLD VENIPUNCTURE: CPT

## 2019-06-02 PROCEDURE — 2140000000 HC CCU INTERMEDIATE R&B

## 2019-06-02 PROCEDURE — 83735 ASSAY OF MAGNESIUM: CPT

## 2019-06-02 PROCEDURE — 82330 ASSAY OF CALCIUM: CPT

## 2019-06-02 PROCEDURE — 6370000000 HC RX 637 (ALT 250 FOR IP): Performed by: INTERNAL MEDICINE

## 2019-06-02 PROCEDURE — 6360000002 HC RX W HCPCS: Performed by: INTERNAL MEDICINE

## 2019-06-02 PROCEDURE — 2580000003 HC RX 258: Performed by: INTERNAL MEDICINE

## 2019-06-02 PROCEDURE — 84100 ASSAY OF PHOSPHORUS: CPT

## 2019-06-02 RX ORDER — MAGNESIUM SULFATE IN WATER 40 MG/ML
2 INJECTION, SOLUTION INTRAVENOUS ONCE
Status: COMPLETED | OUTPATIENT
Start: 2019-06-02 | End: 2019-06-02

## 2019-06-02 RX ORDER — CALCIUM CARBONATE 200(500)MG
2000 TABLET,CHEWABLE ORAL
Status: DISCONTINUED | OUTPATIENT
Start: 2019-06-02 | End: 2019-06-05

## 2019-06-02 RX ORDER — LANOLIN ALCOHOL/MO/W.PET/CERES
400 CREAM (GRAM) TOPICAL 2 TIMES DAILY
Status: DISCONTINUED | OUTPATIENT
Start: 2019-06-02 | End: 2019-06-07 | Stop reason: HOSPADM

## 2019-06-02 RX ADMIN — MAGNESIUM GLUCONATE 500 MG ORAL TABLET 400 MG: 500 TABLET ORAL at 19:59

## 2019-06-02 RX ADMIN — CALCIUM CARBONATE 2000 MG: 500 TABLET, CHEWABLE ORAL at 09:33

## 2019-06-02 RX ADMIN — MAGNESIUM GLUCONATE 500 MG ORAL TABLET 400 MG: 500 TABLET ORAL at 09:34

## 2019-06-02 RX ADMIN — ERGOCALCIFEROL 50000 UNITS: 1.25 CAPSULE ORAL at 09:34

## 2019-06-02 RX ADMIN — CALCIUM CARBONATE (ANTACID) CHEW TAB 500 MG 2000 MG: 500 CHEW TAB at 19:59

## 2019-06-02 RX ADMIN — CALCIUM GLUCONATE 2 G: 98 INJECTION, SOLUTION INTRAVENOUS at 00:28

## 2019-06-02 RX ADMIN — CALCIUM GLUCONATE: 98 INJECTION, SOLUTION INTRAVENOUS at 09:33

## 2019-06-02 RX ADMIN — CALCIUM CARBONATE (ANTACID) CHEW TAB 500 MG 2000 MG: 500 CHEW TAB at 15:59

## 2019-06-02 RX ADMIN — ENOXAPARIN SODIUM 40 MG: 40 INJECTION SUBCUTANEOUS at 09:33

## 2019-06-02 RX ADMIN — MAGNESIUM SULFATE 2 G: 2 INJECTION INTRAVENOUS at 14:31

## 2019-06-02 RX ADMIN — CALCITRIOL 1 MCG: 0.25 CAPSULE ORAL at 09:34

## 2019-06-02 RX ADMIN — Medication 10 ML: at 09:34

## 2019-06-02 RX ADMIN — PANTOPRAZOLE SODIUM 40 MG: 40 TABLET, DELAYED RELEASE ORAL at 06:44

## 2019-06-02 ASSESSMENT — PAIN SCALES - GENERAL
PAINLEVEL_OUTOF10: 0

## 2019-06-02 NOTE — PROGRESS NOTES
Hospitalist Progress Note      PCP: No primary care provider on file. 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 Nephrology consulted and pt on calcium drip, calcitriol, and oral calcium. Pt continues to have low Ca levels, Ca drip discontinued. Ca levels continue to improve, however on am of 6/2 patient's Ca dropped again off IV calcium, so he was restarted on calcium drip. Subjective: *Pt reports tingling has resolved       Medications:  Reviewed    Infusion Medications    IV infusion builder 50 mL/hr at 06/02/19 0933     Scheduled Medications    magnesium oxide  400 mg Oral Daily    calcium carbonate  2,000 mg Oral TID    calcitRIOL  1 mcg Oral Daily    vitamin D  50,000 Units Oral Weekly    pantoprazole  40 mg Oral QAM AC    enoxaparin  40 mg Subcutaneous Daily    sodium chloride flush  10 mL Intravenous BID     PRN Meds: HYDROcodone 5 mg - acetaminophen, acetaminophen, trimethobenzamide      Intake/Output Summary (Last 24 hours) at 6/2/2019 1249  Last data filed at 6/2/2019 0841  Gross per 24 hour   Intake 940 ml   Output 1400 ml   Net -460 ml       Exam:    /69   Pulse 103   Temp 97.9 °F (36.6 °C) (Temporal)   Resp 16   Ht 6' 1\" (1.854 m)   Wt 192 lb 8 oz (87.3 kg)   SpO2 98%   BMI 25.40 kg/m²       General appearance:  No apparent distress, appears stated age and cooperative. HEENT:  Normal cephalic, atraumatic without obvious deformity. Pupils equal, round, and reactive to light.  Extra ocular muscles intact. Conjunctivae/corneas clear. Neck: Supple, with full range of motion. No jugular venous distention. Trachea midline. Surgical scar in line with previous surgery  Respiratory:  Normal respiratory effort. Clear to auscultation, bilaterally without Rales/Wheezes/Rhonchi.   Cardiovascular:  Regular rate and rhythm Abdomen: Soft, non-tender, non-distended with normal bowel sounds. Musculoskeletal:  No clubbing, cyanosis or edema bilaterally.    Skin: Skin color, texture, turgor normal.  No rashes or lesions. Mild L arm swelling (IV site had infiltrated)  Neurologic:  Neurovascularly intact without any focal sensory/motor deficits. Cranial nerves: II-XII intact, grossly non-focal.  Psychiatric:  Alert and oriented, thought content appropriate, normal insight  Capillary Refill: Brisk,< 3 seconds   Peripheral Pulses: +2 palpable, equal bilaterally                  Labs:   Recent Labs     19  0935   WBC 7.7   HGB 12.7   HCT 36.7*        Recent Labs     19  0746 19  0934 19  0753    140  --    K 3.9 3.8  --    CL 98 101  --    CO2 25 27  --    BUN 9 10  --    CREATININE 0.9 0.9  --    CALCIUM 7.8* 8.5*  --    PHOS 6.0* 4.0 5.8*     Recent Labs     19  0746   AST 15   ALT 23   BILITOT 0.7   ALKPHOS 96     No results for input(s): INR in the last 72 hours. No results for input(s): Dareen Serge in the last 72 hours. Recent Labs     19  0746   AST 15   ALT 23   BILITOT 0.7   ALKPHOS 96     No results for input(s): LACTA in the last 72 hours. No results found for: Estefany Prather  No results found for: AMMONIA    Assessment:    Active Hospital Problems    Diagnosis Date Noted    Hypocalcemia [E83.51] 2019    Prolonged Q-T interval on ECG [R94.31] 2019    Hypomagnesemia [E83.42] 2019    H/O thyroidectomy [Z98.890] 2019    Hypocalcemia and hypomagnesemia of  [P71.1] 2019       Plan:  *Monitor labs  Replace k   cont phoslo   Nephrology consulted;   Ca level dropped when drip was discontinued, nephrology has restarted calcium drip    DVT Prophylaxis: *lovenox  Diet: No diet orders on file  Code Status: full code     PT/OT Eval Status: *na     Dispo - *back to halfway         Electronically signed by Adebayo Orta MD on 2019 at 12:49

## 2019-06-02 NOTE — PROGRESS NOTES
one time dose of calcium gluc 2gm iv still infusing while midnight lab work of ionized calcium was drawn.

## 2019-06-02 NOTE — PROGRESS NOTES
Department of Internal Medicine  Nephrology Attending Progress Note    Events reviewed,    SUBJECTIVE:  We are following this patient for hypocalcemia. Reports no complaints. Physical Exam:    VITALS:  /69   Pulse 103   Temp 97.9 °F (36.6 °C) (Temporal)   Resp 16   Ht 6' 1\" (1.854 m)   Wt 192 lb 8 oz (87.3 kg)   SpO2 98%   BMI 25.40 kg/m²   24HR INTAKE/OUTPUT:      Intake/Output Summary (Last 24 hours) at 6/2/2019 1246  Last data filed at 6/2/2019 0841  Gross per 24 hour   Intake 940 ml   Output 1400 ml   Net -460 ml     GENERAL: Awake alert in no distress  EYES: no pallor, no icterus   NOSE EAR THROAT: no ulcers, no rashes, no drainage   NECK: thyroid not palpable, LN not appreciated   RESP: air entry b/l , No added sounds. CVS: S1 S2 heard, No murmur gallop or rub appreciated   GI: soft, non tender, no organomegaly appreciated, BS +  MS/ Ext: Joints normal, no swelling. No edema, Pulses equal.   NEURO: AAOx3, strength and sensation grossly intact.        DATA:    CBC:   Lab Results   Component Value Date    WBC 7.7 06/01/2019    RBC 4.26 06/01/2019    HGB 12.7 06/01/2019    HCT 36.7 06/01/2019    MCV 86.2 06/01/2019    MCH 29.8 06/01/2019    MCHC 34.6 06/01/2019    RDW 11.8 06/01/2019     06/01/2019    MPV 11.1 06/01/2019     CMP:    Lab Results   Component Value Date     06/01/2019    K 3.8 06/01/2019    K 3.7 05/25/2019     06/01/2019    CO2 27 06/01/2019    BUN 10 06/01/2019    CREATININE 0.9 06/01/2019    GFRAA >60 06/01/2019    LABGLOM >60 06/01/2019    GLUCOSE 123 06/01/2019    PROT 6.5 05/31/2019    LABALBU 4.0 05/31/2019    CALCIUM 8.5 06/01/2019    BILITOT 0.7 05/31/2019    ALKPHOS 96 05/31/2019    AST 15 05/31/2019    ALT 23 05/31/2019     Magnesium:    Lab Results   Component Value Date    MG 1.4 06/02/2019     Phosphorus:    Lab Results   Component Value Date    PHOS 5.8 06/02/2019     Vitamin D 25:16 (low)    Radiology Review:        BRIEF SUMMARY OF INITIAL CONSULT:    Briefly Mr. Sara Alonzo is a 75-year-old man inmate with history of thyroid cancer status post thyroidectomy, as well hypercalcemia due to primary hyperparathyroidism, who underwent recent parathyroidectomy, who was admitted on May 24, 2019 after he was brought to the ER for evaluation of body's disease and numbness. He was found to have severe hypocalcemia with ionized calcium was 0.69 and total calcium was 5.2, reason for this consultation. IMPRESSION/RECOMMENDATIONS:      1. 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 On , calcitriol, ergocalciferol, and oral calcium. · Calcium levels have dropped overnight after discontinuation of IV calcium. 2. Hypoparathyroidism, status post surgical resection, PTH level 5  3. Hyperphosphatemia, secondary to hypoparathyroidism  4. Hypomagnesemia, probably due to poor oral intake  5.  Vitamin D deficiency, vitamin D 25 level 16, on ergocalciferol, calcitriol    Plan:    · Restart calcium drip  · Replace Mg  · Continue calcitriol  · Continue ergocalciferol 50,000 units weekly  · Increase Calcium carbonate to 2 g 4 times a day  · Increase magnesium oxide 400 mg to bid   · Continue to monitor ionized calcium every 6 hours

## 2019-06-03 LAB
CALCIUM IONIZED: 1.11 MMOL/L (ref 1.15–1.33)
CALCIUM IONIZED: 1.14 MMOL/L (ref 1.15–1.33)
CALCIUM IONIZED: 1.16 MMOL/L (ref 1.15–1.33)
CALCIUM IONIZED: 1.2 MMOL/L (ref 1.15–1.33)
MAGNESIUM: 1.6 MG/DL (ref 1.6–2.6)
PHOSPHORUS: 5.4 MG/DL (ref 2.5–4.5)

## 2019-06-03 PROCEDURE — 83735 ASSAY OF MAGNESIUM: CPT

## 2019-06-03 PROCEDURE — 6360000002 HC RX W HCPCS: Performed by: INTERNAL MEDICINE

## 2019-06-03 PROCEDURE — 2140000000 HC CCU INTERMEDIATE R&B

## 2019-06-03 PROCEDURE — 82330 ASSAY OF CALCIUM: CPT

## 2019-06-03 PROCEDURE — 84100 ASSAY OF PHOSPHORUS: CPT

## 2019-06-03 PROCEDURE — 6370000000 HC RX 637 (ALT 250 FOR IP): Performed by: INTERNAL MEDICINE

## 2019-06-03 PROCEDURE — 36415 COLL VENOUS BLD VENIPUNCTURE: CPT

## 2019-06-03 PROCEDURE — 2580000003 HC RX 258: Performed by: INTERNAL MEDICINE

## 2019-06-03 RX ORDER — MAGNESIUM SULFATE IN WATER 40 MG/ML
2 INJECTION, SOLUTION INTRAVENOUS ONCE
Status: COMPLETED | OUTPATIENT
Start: 2019-06-03 | End: 2019-06-03

## 2019-06-03 RX ADMIN — CALCIUM CARBONATE (ANTACID) CHEW TAB 500 MG 2000 MG: 500 CHEW TAB at 17:30

## 2019-06-03 RX ADMIN — ENOXAPARIN SODIUM 40 MG: 40 INJECTION SUBCUTANEOUS at 09:40

## 2019-06-03 RX ADMIN — MAGNESIUM SULFATE 2 G: 2 INJECTION INTRAVENOUS at 12:09

## 2019-06-03 RX ADMIN — Medication 10 ML: at 09:40

## 2019-06-03 RX ADMIN — PANTOPRAZOLE SODIUM 40 MG: 40 TABLET, DELAYED RELEASE ORAL at 06:14

## 2019-06-03 RX ADMIN — CALCITRIOL 1 MCG: 0.25 CAPSULE ORAL at 09:40

## 2019-06-03 RX ADMIN — CALCIUM CARBONATE (ANTACID) CHEW TAB 500 MG 2000 MG: 500 CHEW TAB at 12:09

## 2019-06-03 RX ADMIN — MAGNESIUM GLUCONATE 500 MG ORAL TABLET 400 MG: 500 TABLET ORAL at 09:40

## 2019-06-03 RX ADMIN — MAGNESIUM GLUCONATE 500 MG ORAL TABLET 400 MG: 500 TABLET ORAL at 19:54

## 2019-06-03 RX ADMIN — CALCIUM CARBONATE (ANTACID) CHEW TAB 500 MG 2000 MG: 500 CHEW TAB at 19:54

## 2019-06-03 RX ADMIN — CALCIUM CARBONATE (ANTACID) CHEW TAB 500 MG 2000 MG: 500 CHEW TAB at 09:40

## 2019-06-03 ASSESSMENT — PAIN SCALES - GENERAL
PAINLEVEL_OUTOF10: 0

## 2019-06-03 NOTE — CARE COORDINATION
SOCIAL WORK/CASEMANAG[facility] TRANSITION OF CARE PLANNING: pt remains on iv calcium with level down to 1. 11. He is a prisoner at [facility]. The plans is for pt to return there, [facility] Phone: [phone], Fax: [phone] at bedside. Will talk with pcp about plan for pt. [facility]  6/3/2019    Spoke with dr. Jose Torres who is covering pt and she said that they just need to get the right oral dose for pt before sending back to [facility].  [facility]  6/3/2019

## 2019-06-03 NOTE — PROGRESS NOTES
Department of Internal Medicine  Nephrology Attending Progress Note    Events reviewed,    SUBJECTIVE:  We are following this patient for hypocalcemia. Reports no complaints. Physical Exam:    VITALS:  BP (!) 152/94   Pulse 98   Temp 98.2 °F (36.8 °C) (Temporal)   Resp 18   Ht 6' 1\" (1.854 m)   Wt 192 lb 8 oz (87.3 kg)   SpO2 98%   BMI 25.40 kg/m²   24HR INTAKE/OUTPUT:      Intake/Output Summary (Last 24 hours) at 6/3/2019 1052  Last data filed at 6/3/2019 6534  Gross per 24 hour   Intake 1425.47 ml   Output 2425 ml   Net -999.53 ml     GENERAL: Awake alert in no distress  EYES: no pallor, no icterus   NOSE EAR THROAT: no ulcers, no rashes, no drainage   NECK: thyroid not palpable, LN not appreciated   RESP: air entry b/l , No added sounds. CVS: S1 S2 heard, No murmur gallop or rub appreciated   GI: soft, non tender, no organomegaly appreciated, BS +  MS/ Ext: Joints normal, no swelling. No edema, Pulses equal.   NEURO: AAOx3, strength and sensation grossly intact.        DATA:    CBC:   Lab Results   Component Value Date    WBC 7.7 06/01/2019    RBC 4.26 06/01/2019    HGB 12.7 06/01/2019    HCT 36.7 06/01/2019    MCV 86.2 06/01/2019    MCH 29.8 06/01/2019    MCHC 34.6 06/01/2019    RDW 11.8 06/01/2019     06/01/2019    MPV 11.1 06/01/2019     CMP:    Lab Results   Component Value Date     06/01/2019    K 3.8 06/01/2019    K 3.7 05/25/2019     06/01/2019    CO2 27 06/01/2019    BUN 10 06/01/2019    CREATININE 0.9 06/01/2019    GFRAA >60 06/01/2019    LABGLOM >60 06/01/2019    GLUCOSE 123 06/01/2019    PROT 6.5 05/31/2019    LABALBU 4.0 05/31/2019    CALCIUM 8.5 06/01/2019    BILITOT 0.7 05/31/2019    ALKPHOS 96 05/31/2019    AST 15 05/31/2019    ALT 23 05/31/2019     Magnesium:    Lab Results   Component Value Date    MG 1.6 06/03/2019     Phosphorus:    Lab Results   Component Value Date    PHOS 5.4 06/03/2019     Vitamin D 25:16 (low)    Radiology Review:        BRIEF SUMMARY OF INITIAL CONSULT:    Briefly Mr. Ora Doll is a 78-year-old man inmate with history of thyroid cancer status post thyroidectomy, as well hypercalcemia due to primary hyperparathyroidism, who underwent recent parathyroidectomy, who was admitted on May 24, 2019 after he was brought to the ER for evaluation of body's disease and numbness. He was found to have severe hypocalcemia with ionized calcium was 0.69 and total calcium was 5.2, reason for this consultation. IMPRESSION/RECOMMENDATIONS:      1. 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 On , calcitriol, ergocalciferol, and oral calcium. · Calcium levels improved with calcium drip. We will continue for another 24 hours. 2. Hypoparathyroidism, status post surgical resection, PTH level 5  3. Hyperphosphatemia, secondary to hypoparathyroidism  4. Hypomagnesemia, probably due to poor oral intake, to continue replacemnt   5.  Vitamin D deficiency, vitamin D 25 level 16, on ergocalciferol, calcitriol    Plan:    · Continue calcium drip  · Replace Mg  · Continue calcitriol  · Continue ergocalciferol 50,000 units weekly  · Increase Calcium carbonate to 2 g 4 times a day  · Increase magnesium oxide 400 mg to bid   · Continue to monitor ionized calcium every 6 hours

## 2019-06-03 NOTE — PROGRESS NOTES
Hospitalist Progress Note      PCP: No primary care provider on file. Date of Admission: 5/24/2019    Chief Complaint: *paraesthesias, spasms    [facility] [place] He has a known history of having had 3 neck surgeries. He said he has his thyroid removed due to cancer, he then had his parathyroid removed, and he had another surgery that he does not know the reason on  His neck.  He has had muscle spasms, tingling in his hands and mouth, and fatigue, becoming progressively worse   given calcium gluconate and mag . He was on IV calcium and it was discontinued in preparation  For discharge. , then his calcium dropped, so he is  Back on  IV calcium     Subjective: *ready for discharge      Medications:  Reviewed    Infusion Medications    IV infusion builder 50 mL/hr at 06/02/19 0933     Scheduled Medications    magnesium oxide  400 mg Oral BID    calcium carbonate  2,000 mg Oral 4x Daily WC    calcitRIOL  1 mcg Oral Daily    vitamin D  50,000 Units Oral Weekly    pantoprazole  40 mg Oral QAM AC    enoxaparin  40 mg Subcutaneous Daily    sodium chloride flush  10 mL Intravenous BID     PRN Meds: HYDROcodone 5 mg - acetaminophen, acetaminophen, trimethobenzamide      Intake/Output Summary (Last 24 hours) at 6/3/2019 1505  Last data filed at 6/3/2019 1450  Gross per 24 hour   Intake 1669.09 ml   Output 2400 ml   Net -730.91 ml       Exam:    BP (!) 152/94   Pulse 98   Temp 98.2 °F (36.8 °C) (Temporal)   Resp 18   Ht 6' 1\" (1.854 m)   Wt 192 lb 8 oz (87.3 kg)   SpO2 98%   BMI 25.40 kg/m²           Gen: * well developed  HEENT: NC/AT, moist mucous membranes, no oropharyngeal erythema or exudate  Neck: supple, trachea midline, no anterior cervical or SC LAD  Heart:  Normal s1/s2, RRR, no murmurs, gallops, or rubs.    Lungs:  *cta  bilaterally, *  Abd: bowel sounds present, soft, nontender, nondistended, no masses  Extrem:  No clubbing, cyanosis,   No * edema  Skin: no rashes or lesions  Psych: A & O x3  Neuro: grossly intact, moves all four extremities. Capillary Refill: Brisk,< 3 seconds   Peripheral Pulses: +2 palpable, equal bilaterally               Labs:   Recent Labs     19  0935   WBC 7.7   HGB 12.7   HCT 36.7*        Recent Labs     19  0934 19  0753 19  0657     --   --    K 3.8  --   --      --   --    CO2 27  --   --    BUN 10  --   --    CREATININE 0.9  --   --    CALCIUM 8.5*  --   --    PHOS 4.0 5.8* 5.4*     No results for input(s): AST, ALT, BILIDIR, BILITOT, ALKPHOS in the last 72 hours. No results for input(s): INR in the last 72 hours. No results for input(s): [place] in the last 72 hours. No results for input(s): AST, ALT, ALB, BILIDIR, BILITOT, ALKPHOS in the last 72 hours. No results for input(s): LACTA in the last 72 hours.   No results found for: Leeann Lout  No results found for: AMMONIA    Assessment:    Active Hospital Problems    Diagnosis Date Noted    Hypocalcemia [E83.51] 2019    Prolonged Q-T interval on ECG [R94.31] 2019    Hypomagnesemia [E83.42] 2019    H/O thyroidectomy [Z98.890] 2019    Hypocalcemia and hypomagnesemia of  [P71.1] 2019       Plan:  Cont replacement    DVT Prophylaxis: *lovenox  Diet: DIET GENERAL; Low Phosphorus  Code Status: Full Code    PT/OT Eval Status:  NA*    Dispo -  Back to shelter **      Electronically signed by Adrian Barlow DO on 6/3/2019 at 3:05 PM

## 2019-06-03 NOTE — PLAN OF CARE
Problem: Physical Regulation:  Goal: Ability to maintain clinical measurements within normal limits will improve  Outcome: Not Met This Shift

## 2019-06-04 LAB
ANION GAP SERPL CALCULATED.3IONS-SCNC: 14 MMOL/L (ref 7–16)
BUN BLDV-MCNC: 10 MG/DL (ref 6–20)
CALCIUM IONIZED: 1.17 MMOL/L (ref 1.15–1.33)
CALCIUM IONIZED: 1.19 MMOL/L (ref 1.15–1.33)
CALCIUM IONIZED: 1.2 MMOL/L (ref 1.15–1.33)
CALCIUM IONIZED: 1.25 MMOL/L (ref 1.15–1.33)
CALCIUM SERPL-MCNC: 8.5 MG/DL (ref 8.6–10.2)
CHLORIDE BLD-SCNC: 100 MMOL/L (ref 98–107)
CO2: 28 MMOL/L (ref 22–29)
CREAT SERPL-MCNC: 1 MG/DL (ref 0.7–1.2)
GFR AFRICAN AMERICAN: >60
GFR NON-AFRICAN AMERICAN: >60 ML/MIN/1.73
GLUCOSE BLD-MCNC: 99 MG/DL (ref 74–99)
HCT VFR BLD CALC: 38.7 % (ref 37–54)
HEMOGLOBIN: 13.1 G/DL (ref 12.5–16.5)
MAGNESIUM: 1.5 MG/DL (ref 1.6–2.6)
MCH RBC QN AUTO: 29.8 PG (ref 26–35)
MCHC RBC AUTO-ENTMCNC: 33.9 % (ref 32–34.5)
MCV RBC AUTO: 88.2 FL (ref 80–99.9)
PDW BLD-RTO: 11.8 FL (ref 11.5–15)
PHOSPHORUS: 6.9 MG/DL (ref 2.5–4.5)
PLATELET # BLD: 193 E9/L (ref 130–450)
PMV BLD AUTO: 10.5 FL (ref 7–12)
POTASSIUM SERPL-SCNC: 4 MMOL/L (ref 3.5–5)
RBC # BLD: 4.39 E12/L (ref 3.8–5.8)
SODIUM BLD-SCNC: 142 MMOL/L (ref 132–146)
WBC # BLD: 5.7 E9/L (ref 4.5–11.5)

## 2019-06-04 PROCEDURE — 85027 COMPLETE CBC AUTOMATED: CPT

## 2019-06-04 PROCEDURE — 2580000003 HC RX 258: Performed by: INTERNAL MEDICINE

## 2019-06-04 PROCEDURE — 2140000000 HC CCU INTERMEDIATE R&B

## 2019-06-04 PROCEDURE — 80048 BASIC METABOLIC PNL TOTAL CA: CPT

## 2019-06-04 PROCEDURE — 6370000000 HC RX 637 (ALT 250 FOR IP): Performed by: INTERNAL MEDICINE

## 2019-06-04 PROCEDURE — 36415 COLL VENOUS BLD VENIPUNCTURE: CPT

## 2019-06-04 PROCEDURE — 82330 ASSAY OF CALCIUM: CPT

## 2019-06-04 PROCEDURE — 84100 ASSAY OF PHOSPHORUS: CPT

## 2019-06-04 PROCEDURE — 6360000002 HC RX W HCPCS: Performed by: INTERNAL MEDICINE

## 2019-06-04 PROCEDURE — 83735 ASSAY OF MAGNESIUM: CPT

## 2019-06-04 RX ORDER — MAGNESIUM SULFATE IN WATER 40 MG/ML
2 INJECTION, SOLUTION INTRAVENOUS ONCE
Status: COMPLETED | OUTPATIENT
Start: 2019-06-04 | End: 2019-06-04

## 2019-06-04 RX ADMIN — CALCIUM CARBONATE (ANTACID) CHEW TAB 500 MG 2000 MG: 500 CHEW TAB at 16:44

## 2019-06-04 RX ADMIN — CALCITRIOL 1 MCG: 0.25 CAPSULE ORAL at 09:08

## 2019-06-04 RX ADMIN — MAGNESIUM SULFATE 2 G: 2 INJECTION INTRAVENOUS at 09:09

## 2019-06-04 RX ADMIN — CALCIUM CARBONATE (ANTACID) CHEW TAB 500 MG 2000 MG: 500 CHEW TAB at 22:03

## 2019-06-04 RX ADMIN — CALCIUM CARBONATE (ANTACID) CHEW TAB 500 MG 2000 MG: 500 CHEW TAB at 14:59

## 2019-06-04 RX ADMIN — CALCIUM GLUCONATE: 98 INJECTION, SOLUTION INTRAVENOUS at 09:08

## 2019-06-04 RX ADMIN — CALCIUM CARBONATE (ANTACID) CHEW TAB 500 MG 2000 MG: 500 CHEW TAB at 09:08

## 2019-06-04 RX ADMIN — ENOXAPARIN SODIUM 40 MG: 40 INJECTION SUBCUTANEOUS at 09:08

## 2019-06-04 RX ADMIN — PANTOPRAZOLE SODIUM 40 MG: 40 TABLET, DELAYED RELEASE ORAL at 05:30

## 2019-06-04 RX ADMIN — MAGNESIUM GLUCONATE 500 MG ORAL TABLET 400 MG: 500 TABLET ORAL at 09:08

## 2019-06-04 RX ADMIN — MAGNESIUM GLUCONATE 500 MG ORAL TABLET 400 MG: 500 TABLET ORAL at 22:03

## 2019-06-04 ASSESSMENT — PAIN SCALES - GENERAL
PAINLEVEL_OUTOF10: 0

## 2019-06-04 NOTE — PROGRESS NOTES
CONSULT:    Briefly Mr. Bard Abrams is a 52-year-old man inmate with history of thyroid cancer status post thyroidectomy, as well hypercalcemia due to primary hyperparathyroidism, who underwent recent parathyroidectomy, who was admitted on May 24, 2019 after he was brought to the ER for evaluation of body's disease and numbness. He was found to have severe hypocalcemia with ionized calcium was 0.69 and total calcium was 5.2, reason for this consultation. IMPRESSION/RECOMMENDATIONS:      1. 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 On , calcitriol, ergocalciferol, and oral calcium. · Calcium levels improved with calcium drip. We will continue for another 24 hours. 2. Hypoparathyroidism, status post surgical resection, PTH level 5  3. Hyperphosphatemia, secondary to hypoparathyroidism  4. Hypomagnesemia, probably due to poor oral intake, to continue replacemnt   5.  Vitamin D deficiency, vitamin D 25 level 16, on ergocalciferol, calcitriol    Plan:    · Continue calcium drip  · Replace Mg  · Continue calcitriol  · Continue ergocalciferol 50,000 units weekly  · Increase Calcium carbonate to 2 g 4 times a day  · Increase magnesium oxide 400 mg to bid   · Continue to monitor ionized calcium every 12 hours

## 2019-06-04 NOTE — PROGRESS NOTES
Hospitalist Progress Note      PCP: No primary care provider on file. Date of Admission: 5/24/2019    Chief Complaint: *paraesthesias, spasms     [facility] Course: He has a known history of having had 3 neck surgeries. He said he has his thyroid removed due to cancer, he then had his parathyroid removed, and he had another surgery that he does not know the reason on  His neck.  He has had muscle spasms, tingling in his hands and mouth, and fatigue, becoming progressively worse   given calcium gluconate and mag . He was on IV calcium and it was discontinued in preparation  For discharge. , then his calcium dropped, so he is  Back on  IV calcium  May have to be on IV calcium at discharge    Subjective: * bored      Medications:  Reviewed    Infusion Medications    IV infusion builder 50 mL/hr at 06/04/19 0908     Scheduled Medications    magnesium oxide  400 mg Oral BID    calcium carbonate  2,000 mg Oral 4x Daily WC    calcitRIOL  1 mcg Oral Daily    vitamin D  50,000 Units Oral Weekly    pantoprazole  40 mg Oral QAM AC    enoxaparin  40 mg Subcutaneous Daily    sodium chloride flush  10 mL Intravenous BID     PRN Meds: HYDROcodone 5 mg - acetaminophen, acetaminophen, trimethobenzamide      Intake/Output Summary (Last 24 hours) at 6/4/2019 1404  Last data filed at 6/4/2019 1359  Gross per 24 hour   Intake 3446.62 ml   Output 2960 ml   Net 486.62 ml       Exam:    BP (!) 153/73   Pulse 86   Temp 97.9 °F (36.6 °C) (Temporal)   Resp 16   Ht 6' 1\" (1.854 m)   Wt 192 lb 8 oz (87.3 kg)   SpO2 100%   BMI 25.40 kg/m²          Gen: * well developed  HEENT: NC/AT, moist mucous membranes,  Neck: supple, trachea midline, no anterior cervical or SC LAD  Heart:  Normal s1/s2, RRR, no murmurs, gallops, or rubs.    Lungs:  *cta  bilaterally, *  Abd: bowel sounds present, soft, nontender, nondistended, no masses  Extrem:  No clubbing, cyanosis,   No * edema  Skin: no rashes or lesions  Psych: A & O x3  Neuro: grossly intact, moves all four extremities. Capillary Refill: Brisk,< 3 seconds   Peripheral Pulses: +2 palpable, equal bilaterally                       Labs:   Recent Labs     19  0909   WBC 5.7   HGB 13.1   HCT 38.7        Recent Labs     19  0753 19  0657 19  0442 19  0909   NA  --   --   --  142   K  --   --   --  4.0   CL  --   --   --  100   CO2  --   --   --  28   BUN  --   --   --  10   CREATININE  --   --   --  1.0   CALCIUM  --   --   --  8.5*   PHOS 5.8* 5.4* 6.9*  --      No results for input(s): AST, ALT, BILIDIR, BILITOT, ALKPHOS in the last 72 hours. No results for input(s): INR in the last 72 hours. No results for input(s): Madalynn Sayda in the last 72 hours. No results for input(s): AST, ALT, ALB, BILIDIR, BILITOT, ALKPHOS in the last 72 hours. No results for input(s): LACTA in the last 72 hours.   No results found for: Andie Diamondob  No results found for: AMMONIA    Assessment:    Active Hospital Problems    Diagnosis Date Noted    Hypocalcemia [E83.51] 2019    Prolonged Q-T interval on ECG [R94.31] 2019    Hypomagnesemia [E83.42] 2019    H/O thyroidectomy [Z98.890] 2019    Hypocalcemia and hypomagnesemia of  [P71.1] 2019       Plan:   cont replacement    DVT Prophylaxis: *lovenox  Diet: DIET GENERAL; Low Phosphorus  Code Status: Full Code     PT/OT Eval Status:  NA*     Dispo -  Back to half-way **             Electronically signed by Delfino Benites DO on 2019 at 2:04 PM

## 2019-06-04 NOTE — PLAN OF CARE
Problem: Physical Regulation:  Goal: Ability to maintain clinical measurements within normal limits will improve  Description  Ability to maintain clinical measurements within normal limits will improve  6/3/2019 2021 by Kaila Moctezuma RN  Outcome: Met This Shift  6/3/2019 1606 by Christa Baez RN  Outcome: Not Met This Shift

## 2019-06-04 NOTE — CARE COORDINATION
[facility] I spoke with dr. Jennifer De Santiago and they are going to stop the iv calcium and if labs do not improve then she said they will discharge pt and bring him in outpatient for iv infusion.  rn notified Vesta Angelucci  6/4/2019

## 2019-06-05 LAB
CALCIUM IONIZED: 1.15 MMOL/L (ref 1.15–1.33)
CALCIUM IONIZED: 1.23 MMOL/L (ref 1.15–1.33)
MAGNESIUM: 1.6 MG/DL (ref 1.6–2.6)
PHOSPHORUS: 5.7 MG/DL (ref 2.5–4.5)

## 2019-06-05 PROCEDURE — 6370000000 HC RX 637 (ALT 250 FOR IP): Performed by: INTERNAL MEDICINE

## 2019-06-05 PROCEDURE — 36415 COLL VENOUS BLD VENIPUNCTURE: CPT

## 2019-06-05 PROCEDURE — 84100 ASSAY OF PHOSPHORUS: CPT

## 2019-06-05 PROCEDURE — 83735 ASSAY OF MAGNESIUM: CPT

## 2019-06-05 PROCEDURE — 2140000000 HC CCU INTERMEDIATE R&B

## 2019-06-05 PROCEDURE — 82330 ASSAY OF CALCIUM: CPT

## 2019-06-05 PROCEDURE — 6360000002 HC RX W HCPCS: Performed by: INTERNAL MEDICINE

## 2019-06-05 PROCEDURE — 2580000003 HC RX 258: Performed by: INTERNAL MEDICINE

## 2019-06-05 RX ORDER — CALCIUM CARBONATE 200(500)MG
1000 TABLET,CHEWABLE ORAL 3 TIMES DAILY
Status: DISCONTINUED | OUTPATIENT
Start: 2019-06-05 | End: 2019-06-05

## 2019-06-05 RX ORDER — CALCIUM CARBONATE 200(500)MG
1000 TABLET,CHEWABLE ORAL 4 TIMES DAILY
Status: DISCONTINUED | OUTPATIENT
Start: 2019-06-05 | End: 2019-06-07 | Stop reason: HOSPADM

## 2019-06-05 RX ADMIN — CALCIUM CARBONATE (ANTACID) CHEW TAB 500 MG 1000 MG: 500 CHEW TAB at 20:11

## 2019-06-05 RX ADMIN — Medication 10 ML: at 20:11

## 2019-06-05 RX ADMIN — ENOXAPARIN SODIUM 40 MG: 40 INJECTION SUBCUTANEOUS at 08:22

## 2019-06-05 RX ADMIN — PANTOPRAZOLE SODIUM 40 MG: 40 TABLET, DELAYED RELEASE ORAL at 07:06

## 2019-06-05 RX ADMIN — CALCIUM CARBONATE (ANTACID) CHEW TAB 500 MG 2000 MG: 500 CHEW TAB at 12:33

## 2019-06-05 RX ADMIN — MAGNESIUM GLUCONATE 500 MG ORAL TABLET 400 MG: 500 TABLET ORAL at 20:11

## 2019-06-05 RX ADMIN — CALCIUM CARBONATE (ANTACID) CHEW TAB 500 MG 1000 MG: 500 CHEW TAB at 16:05

## 2019-06-05 RX ADMIN — CALCIUM CARBONATE (ANTACID) CHEW TAB 500 MG 2000 MG: 500 CHEW TAB at 08:22

## 2019-06-05 RX ADMIN — CALCIUM ACETATE 1334 MG: 667 CAPSULE ORAL at 17:31

## 2019-06-05 RX ADMIN — MAGNESIUM GLUCONATE 500 MG ORAL TABLET 400 MG: 500 TABLET ORAL at 08:21

## 2019-06-05 RX ADMIN — CALCITRIOL 1 MCG: 0.25 CAPSULE ORAL at 08:22

## 2019-06-05 ASSESSMENT — PAIN SCALES - GENERAL
PAINLEVEL_OUTOF10: 0

## 2019-06-05 NOTE — PROGRESS NOTES
Department of Internal Medicine  Nephrology Attending Progress Note    Events reviewed,    SUBJECTIVE:  We are following this patient for hypocalcemia. Reports no complaints including twitching, numbness or weakness      Physical Exam:    VITALS:  /82   Pulse 85   Temp 98.2 °F (36.8 °C) (Temporal)   Resp 20   Ht 6' 1\" (1.854 m)   Wt 192 lb 8 oz (87.3 kg)   SpO2 100%   BMI 25.40 kg/m²   24HR INTAKE/OUTPUT:      Intake/Output Summary (Last 24 hours) at 6/5/2019 1605  Last data filed at 6/5/2019 1200  Gross per 24 hour   Intake 2541 ml   Output 3175 ml   Net -634 ml     GENERAL: Awake alert in no distress  EYES: no pallor, no icterus   NOSE EAR THROAT: no ulcers, no rashes, no drainage   NECK: thyroid not palpable, LN not appreciated   RESP: air entry b/l , No added sounds. CVS: S1 S2 heard, No murmur gallop or rub appreciated   GI: soft, non tender, no organomegaly appreciated, BS +  MS/ Ext: Joints normal, no swelling. No edema, Pulses equal.   NEURO: AAOx3, strength and sensation grossly intact.        DATA:    CBC:   Lab Results   Component Value Date    WBC 5.7 06/04/2019    RBC 4.39 06/04/2019    HGB 13.1 06/04/2019    HCT 38.7 06/04/2019    MCV 88.2 06/04/2019    MCH 29.8 06/04/2019    MCHC 33.9 06/04/2019    RDW 11.8 06/04/2019     06/04/2019    MPV 10.5 06/04/2019     CMP:    Lab Results   Component Value Date     06/04/2019    K 4.0 06/04/2019    K 3.7 05/25/2019     06/04/2019    CO2 28 06/04/2019    BUN 10 06/04/2019    CREATININE 1.0 06/04/2019    GFRAA >60 06/04/2019    LABGLOM >60 06/04/2019    GLUCOSE 99 06/04/2019    PROT 6.5 05/31/2019    LABALBU 4.0 05/31/2019    CALCIUM 8.5 06/04/2019    BILITOT 0.7 05/31/2019    ALKPHOS 96 05/31/2019    AST 15 05/31/2019    ALT 23 05/31/2019     Magnesium:    Lab Results   Component Value Date    MG 1.6 06/05/2019     Phosphorus:    Lab Results   Component Value Date    PHOS 5.7 06/05/2019     Vitamin D 25:16 (low)    Radiology

## 2019-06-05 NOTE — PROGRESS NOTES
Hospitalist Progress Note      PCP: No primary care provider on file. Date of Admission: 5/24/2019    Chief Complaint:  paraesthesias, spasms      Hospital Course: **He has a known history of having had 3 neck surgeries. He said he has his thyroid removed due to cancer, he then had his parathyroid removed, and he had another surgery that he does not know the reason on  His neck.  He has had muscle spasms, tingling in his hands and mouth, and fatigue, becoming progressively worse   given calcium gluconate and mag . He was on IV calcium and it was discontinued in preparation  For discharge. , then his calcium dropped, so he is  Back on  IV calcium  May have to be on IV calcium at discharge       *     Subjective: **wants to discharge      Medications:  Reviewed    Infusion Medications   Scheduled Medications    calcium acetate  1,334 mg Oral TID WC    calcium carbonate  1,000 mg Oral TID    magnesium oxide  400 mg Oral BID    calcitRIOL  1 mcg Oral Daily    vitamin D  50,000 Units Oral Weekly    enoxaparin  40 mg Subcutaneous Daily    sodium chloride flush  10 mL Intravenous BID     PRN Meds: HYDROcodone 5 mg - acetaminophen, acetaminophen, trimethobenzamide      Intake/Output Summary (Last 24 hours) at 6/5/2019 1555  Last data filed at 6/5/2019 1200  Gross per 24 hour   Intake 2541 ml   Output 3175 ml   Net -634 ml       Exam:    /82   Pulse 85   Temp 98.2 °F (36.8 °C) (Temporal)   Resp 20   Ht 6' 1\" (1.854 m)   Wt 192 lb 8 oz (87.3 kg)   SpO2 100%   BMI 25.40 kg/m²         Gen: * well developed  HEENT: NC/AT, moist mucous membranes,  Neck: supple, trachea midline, no anterior cervical or SC LAD  Heart:  Normal s1/s2, RRR, no murmurs, gallops, or rubs.    Lungs:  *cta  bilaterally, *  Abd: bowel sounds present, soft, nontender, nondistended, no masses  Extrem:  No clubbing, cyanosis,   No * edema  Skin: no rashes or lesions  Psych: A & O x3  Neuro: grossly intact, moves all four

## 2019-06-06 LAB
CALCIUM IONIZED: 1.05 MMOL/L (ref 1.15–1.33)
CALCIUM IONIZED: 1.12 MMOL/L (ref 1.15–1.33)
CALCIUM IONIZED: 1.17 MMOL/L (ref 1.15–1.33)
MAGNESIUM: 1.6 MG/DL (ref 1.6–2.6)
MAGNESIUM: 1.8 MG/DL (ref 1.6–2.6)
PHOSPHORUS: 4 MG/DL (ref 2.5–4.5)

## 2019-06-06 PROCEDURE — 83735 ASSAY OF MAGNESIUM: CPT

## 2019-06-06 PROCEDURE — 2140000000 HC CCU INTERMEDIATE R&B

## 2019-06-06 PROCEDURE — 6370000000 HC RX 637 (ALT 250 FOR IP): Performed by: INTERNAL MEDICINE

## 2019-06-06 PROCEDURE — 36415 COLL VENOUS BLD VENIPUNCTURE: CPT

## 2019-06-06 PROCEDURE — 6360000002 HC RX W HCPCS: Performed by: INTERNAL MEDICINE

## 2019-06-06 PROCEDURE — 84100 ASSAY OF PHOSPHORUS: CPT

## 2019-06-06 PROCEDURE — 82330 ASSAY OF CALCIUM: CPT

## 2019-06-06 PROCEDURE — 2580000003 HC RX 258: Performed by: INTERNAL MEDICINE

## 2019-06-06 RX ORDER — CALCITRIOL 0.5 UG/1
1 CAPSULE, LIQUID FILLED ORAL DAILY
Qty: 60 CAPSULE | Refills: 3 | Status: SHIPPED | OUTPATIENT
Start: 2019-06-07

## 2019-06-06 RX ORDER — ERGOCALCIFEROL (VITAMIN D2) 1250 MCG
50000 CAPSULE ORAL WEEKLY
Qty: 5 CAPSULE | Refills: 0 | Status: SHIPPED | OUTPATIENT
Start: 2019-06-09

## 2019-06-06 RX ORDER — MAGNESIUM SULFATE IN WATER 40 MG/ML
2 INJECTION, SOLUTION INTRAVENOUS ONCE
Status: COMPLETED | OUTPATIENT
Start: 2019-06-06 | End: 2019-06-06

## 2019-06-06 RX ORDER — LANOLIN ALCOHOL/MO/W.PET/CERES
400 CREAM (GRAM) TOPICAL 2 TIMES DAILY
Qty: 60 TABLET | Refills: 0 | Status: SHIPPED | OUTPATIENT
Start: 2019-06-06 | End: 2019-07-06

## 2019-06-06 RX ADMIN — Medication 10 ML: at 21:38

## 2019-06-06 RX ADMIN — CALCIUM GLUCONATE 2 G: 98 INJECTION, SOLUTION INTRAVENOUS at 12:23

## 2019-06-06 RX ADMIN — ENOXAPARIN SODIUM 40 MG: 40 INJECTION SUBCUTANEOUS at 08:26

## 2019-06-06 RX ADMIN — CALCIUM ACETATE 1334 MG: 667 CAPSULE ORAL at 12:22

## 2019-06-06 RX ADMIN — MAGNESIUM GLUCONATE 500 MG ORAL TABLET 400 MG: 500 TABLET ORAL at 08:26

## 2019-06-06 RX ADMIN — CALCIUM ACETATE 1334 MG: 667 CAPSULE ORAL at 08:26

## 2019-06-06 RX ADMIN — CALCIUM CARBONATE (ANTACID) CHEW TAB 500 MG 1000 MG: 500 CHEW TAB at 12:22

## 2019-06-06 RX ADMIN — MAGNESIUM GLUCONATE 500 MG ORAL TABLET 400 MG: 500 TABLET ORAL at 21:37

## 2019-06-06 RX ADMIN — CALCIUM CARBONATE (ANTACID) CHEW TAB 500 MG 1000 MG: 500 CHEW TAB at 08:26

## 2019-06-06 RX ADMIN — CALCIUM CARBONATE (ANTACID) CHEW TAB 500 MG 1000 MG: 500 CHEW TAB at 21:37

## 2019-06-06 RX ADMIN — Medication 10 ML: at 08:30

## 2019-06-06 RX ADMIN — MAGNESIUM SULFATE 2 G: 2 INJECTION INTRAVENOUS at 10:53

## 2019-06-06 RX ADMIN — CALCITRIOL 1 MCG: 0.25 CAPSULE ORAL at 08:29

## 2019-06-06 RX ADMIN — CALCIUM CARBONATE (ANTACID) CHEW TAB 500 MG 1000 MG: 500 CHEW TAB at 17:54

## 2019-06-06 RX ADMIN — CALCIUM ACETATE 1334 MG: 667 CAPSULE ORAL at 17:54

## 2019-06-06 ASSESSMENT — PAIN SCALES - GENERAL
PAINLEVEL_OUTOF10: 0

## 2019-06-06 NOTE — PLAN OF CARE
Problem: Physical Regulation:  Goal: Ability to maintain clinical measurements within normal limits will improve  Description  Ability to maintain clinical measurements within normal limits will improve  Outcome: Met This Shift     Problem: Fluid Volume:  Goal: Ability to achieve a balanced intake and output will improve  Description  Ability to achieve a balanced intake and output will improve  6/6/2019 1757 by Chidi Vargas RN  Outcome: Ongoing     Problem: Physical Regulation:  Goal: Will show no signs and symptoms of electrolyte imbalance  Description  Will show no signs and symptoms of electrolyte imbalance  Outcome: Ongoing

## 2019-06-06 NOTE — PLAN OF CARE
Problem: Fluid Volume:  Goal: Ability to achieve a balanced intake and output will improve  Description  Ability to achieve a balanced intake and output will improve  Outcome: Met This Shift     Problem: Physical Regulation:  Goal: Ability to maintain clinical measurements within normal limits will improve  Description  Ability to maintain clinical measurements within normal limits will improve  Outcome: Met This Shift

## 2019-06-06 NOTE — PROGRESS NOTES
grossly intact, moves all four extremities.    Capillary Refill: Brisk,< 3 seconds   Peripheral Pulses: +2 palpable, equal bilaterally                       Labs:   Recent Labs     19  0909   WBC 5.7   HGB 13.1   HCT 38.7        Recent Labs     19  0442 19  0909 19  0626 19  0847   NA  --  142  --   --    K  --  4.0  --   --    CL  --  100  --   --    CO2  --  28  --   --    BUN  --  10  --   --    CREATININE  --  1.0  --   --    CALCIUM  --  8.5*  --   --    PHOS 6.9*  --  5.7* 4.0     No results for input(s): AST, ALT, BILIDIR, BILITOT, ALKPHOS in the last 72 hours. No results for input(s): INR in the last 72 hours. No results for input(s): Floreen Pill in the last 72 hours. No results for input(s): AST, ALT, ALB, BILIDIR, BILITOT, ALKPHOS in the last 72 hours. No results for input(s): LACTA in the last 72 hours.   No results found for: Dari Pastrana  No results found for: AMMONIA    Assessment:    Active Hospital Problems    Diagnosis Date Noted    Hypocalcemia [E83.51] 2019    Prolonged Q-T interval on ECG [R94.31] 2019    Hypomagnesemia [E83.42] 2019    H/O thyroidectomy [Z98.890] 2019    Hypocalcemia and hypomagnesemia of  [P71.1] 2019       Plan:    **cont replacement     DVT Prophylaxis: *lovenox  Diet: DIET GENERAL; Low Phosphorus  Code Status: Full Code     PT/OT Eval Status:  NA*     Dispo -  Back to [facility] **           Electronically signed by Umair aGrcia DO on 2019 at 2:17 PM

## 2019-06-06 NOTE — CARE COORDINATION
I was informed per nursing that renal would like daily labs and possibly daily outpatient iv calcium and magnesium infusions. Stone Gutierrez at [address] regarding discharge needs #[phone] and had to leave voicemail. Await her return call. I called and spoke with Wandy Robledo RN at [facility] regarding dc needs. [facility][phone]  Ext [phone]. Then I called Sarah MARIN Supervisor at [facility]. She said  labs draws they can do except when labs are drawn on Friday they don't get results back until Monday and they do not drawn labs over weekend. Plus, she said they would not be able to do any iv infusions or take him daily to have daily outpt infusions. She said she will get in touch with the regional nurse at [facility]. She said they could send pt to [facility] or an LTAC . I told her pt is ready for discharge today. Await return call from her. Nursing updated.

## 2019-06-06 NOTE — PROGRESS NOTES
Message sent to Dr. Mitchel Rodas regarding discharge and patient's need to be set up at outpatient infusion center. Will await any new orders at this time.       Lorenzo Alonso RN

## 2019-06-06 NOTE — PLAN OF CARE
Problem: Physical Regulation:  Goal: Ability to maintain clinical measurements within normal limits will improve  Description  Ability to maintain clinical measurements within normal limits will improve  6/6/2019 1013 by Smitha Martell RN  Outcome: Met This Shift     Problem: Fluid Volume:  Goal: Ability to achieve a balanced intake and output will improve  Description  Ability to achieve a balanced intake and output will improve  6/6/2019 1013 by Smitha Martell RN  Outcome: Ongoing

## 2019-06-06 NOTE — PROGRESS NOTES
Department of Internal Medicine  Nephrology Attending Progress Note    Events reviewed,    SUBJECTIVE:  We are following this patient for hypocalcemia. Reports no complaints. Physical Exam:    VITALS:  BP (!) 144/74   Pulse 103   Temp 99.1 °F (37.3 °C) (Temporal)   Resp 18   Ht 6' 1\" (1.854 m)   Wt 192 lb 8 oz (87.3 kg)   SpO2 98%   BMI 25.40 kg/m²   24HR INTAKE/OUTPUT:      Intake/Output Summary (Last 24 hours) at 6/6/2019 1037  Last data filed at 6/6/2019 0834  Gross per 24 hour   Intake 1766 ml   Output 2250 ml   Net -484 ml     GENERAL: Awake alert in no distress  EYES: no pallor, no icterus   NOSE EAR THROAT: no ulcers, no rashes, no drainage   NECK: thyroid not palpable, LN not appreciated   RESP: air entry b/l , No added sounds. CVS: S1 S2 heard, No murmur gallop or rub appreciated   GI: soft, non tender, no organomegaly appreciated, BS +  MS/ Ext: Joints normal, no swelling. No edema, Pulses equal.   NEURO: AAOx3, strength and sensation grossly intact.        DATA:    CBC:   Lab Results   Component Value Date    WBC 5.7 06/04/2019    RBC 4.39 06/04/2019    HGB 13.1 06/04/2019    HCT 38.7 06/04/2019    MCV 88.2 06/04/2019    MCH 29.8 06/04/2019    MCHC 33.9 06/04/2019    RDW 11.8 06/04/2019     06/04/2019    MPV 10.5 06/04/2019     CMP:    Lab Results   Component Value Date     06/04/2019    K 4.0 06/04/2019    K 3.7 05/25/2019     06/04/2019    CO2 28 06/04/2019    BUN 10 06/04/2019    CREATININE 1.0 06/04/2019    GFRAA >60 06/04/2019    LABGLOM >60 06/04/2019    GLUCOSE 99 06/04/2019    PROT 6.5 05/31/2019    LABALBU 4.0 05/31/2019    CALCIUM 8.5 06/04/2019    BILITOT 0.7 05/31/2019    ALKPHOS 96 05/31/2019    AST 15 05/31/2019    ALT 23 05/31/2019     Magnesium:    Lab Results   Component Value Date    MG 1.6 06/06/2019     Phosphorus:    Lab Results   Component Value Date    PHOS 4.0 06/06/2019     Vitamin D 25:16 (low)    Radiology Review:        BRIEF SUMMARY OF INITIAL CONSULT:    Briefly Mr. John Sloan is a 75-year-old man inmate with history of thyroid cancer status post thyroidectomy, as well hypercalcemia due to primary hyperparathyroidism, who underwent recent parathyroidectomy, who was admitted on May 24, 2019 after he was brought to the ER for evaluation of body's disease and numbness. He was found to have severe hypocalcemia with ionized calcium was 0.69 and total calcium was 5.2, reason for this consultation. IMPRESSION/RECOMMENDATIONS:      1. 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 On , calcitriol, ergocalciferol, and oral calcium. · Calcium levels drop this morning after discontinuation of calcium drip. 2. Hypoparathyroidism, status post surgical resection, PTH level 5  3. Hyperphosphatemia, secondary to hypoparathyroidism  4. Hypomagnesemia, probably due to poor oral intake, to continue replacemnt   5.  Vitamin D deficiency, vitamin D 25 level 16, on ergocalciferol, calcitriol    Plan:    · Calcium gluconate IV times x 2gr  · Replace Mg  · Continue calcitriol  · Continue ergocalciferol 50,000 units weekly  · Continue Calcium carbonate 2 g 4 times a day  · Continue magnesium oxide 400 mg to bid   · Continue to monitor ionized calcium every 6 hours

## 2019-06-07 VITALS
OXYGEN SATURATION: 100 % | RESPIRATION RATE: 18 BRPM | TEMPERATURE: 97.9 F | WEIGHT: 192.5 LBS | BODY MASS INDEX: 25.51 KG/M2 | SYSTOLIC BLOOD PRESSURE: 134 MMHG | DIASTOLIC BLOOD PRESSURE: 77 MMHG | HEIGHT: 73 IN | HEART RATE: 83 BPM

## 2019-06-07 LAB
CALCIUM IONIZED: 1.08 MMOL/L (ref 1.15–1.33)
MAGNESIUM: 1.6 MG/DL (ref 1.6–2.6)
PHOSPHORUS: 5 MG/DL (ref 2.5–4.5)

## 2019-06-07 PROCEDURE — 6370000000 HC RX 637 (ALT 250 FOR IP): Performed by: INTERNAL MEDICINE

## 2019-06-07 PROCEDURE — 82330 ASSAY OF CALCIUM: CPT

## 2019-06-07 PROCEDURE — 6360000002 HC RX W HCPCS: Performed by: INTERNAL MEDICINE

## 2019-06-07 PROCEDURE — 84100 ASSAY OF PHOSPHORUS: CPT

## 2019-06-07 PROCEDURE — 36415 COLL VENOUS BLD VENIPUNCTURE: CPT

## 2019-06-07 PROCEDURE — 83735 ASSAY OF MAGNESIUM: CPT

## 2019-06-07 PROCEDURE — 2580000003 HC RX 258: Performed by: INTERNAL MEDICINE

## 2019-06-07 RX ADMIN — CALCIUM ACETATE 1334 MG: 667 CAPSULE ORAL at 12:09

## 2019-06-07 RX ADMIN — CALCIUM ACETATE 1334 MG: 667 CAPSULE ORAL at 17:12

## 2019-06-07 RX ADMIN — CALCIUM CARBONATE (ANTACID) CHEW TAB 500 MG 1000 MG: 500 CHEW TAB at 09:04

## 2019-06-07 RX ADMIN — MAGNESIUM GLUCONATE 500 MG ORAL TABLET 400 MG: 500 TABLET ORAL at 09:04

## 2019-06-07 RX ADMIN — CALCIUM ACETATE 1334 MG: 667 CAPSULE ORAL at 08:28

## 2019-06-07 RX ADMIN — Medication 10 ML: at 08:29

## 2019-06-07 RX ADMIN — CALCITRIOL 1 MCG: 0.25 CAPSULE ORAL at 09:04

## 2019-06-07 RX ADMIN — CALCIUM CARBONATE (ANTACID) CHEW TAB 500 MG 1000 MG: 500 CHEW TAB at 13:58

## 2019-06-07 RX ADMIN — ENOXAPARIN SODIUM 40 MG: 40 INJECTION SUBCUTANEOUS at 09:04

## 2019-06-07 RX ADMIN — CALCIUM CARBONATE (ANTACID) CHEW TAB 500 MG 1000 MG: 500 CHEW TAB at 17:13

## 2019-06-07 ASSESSMENT — PAIN SCALES - GENERAL
PAINLEVEL_OUTOF10: 0

## 2019-06-07 NOTE — DISCHARGE INSTR - COC
Independent  Med Admin  Dependent  Med Delivery   whole    Wound Care Documentation and Therapy:        Elimination:  Continence:   · Bowel: Yes  · Bladder: Yes  Urinary Catheter: None   Colostomy/Ileostomy/Ileal Conduit: No       Date of Last BM: 6/6/2019    Intake/Output Summary (Last 24 hours) at 6/7/2019 1222  Last data filed at 6/7/2019 1215  Gross per 24 hour   Intake 1080 ml   Output 2575 ml   Net -1495 ml     I/O last 3 completed shifts: In: 65 [P.O.:956]  Out: 3025 [BFM:0039]    Safety Concerns:     None    Impairments/Disabilities:      None    Nutrition Therapy:  Current Nutrition Therapy:   - Oral Diet:  General    Routes of Feeding: Oral  Liquids: No Restrictions  Daily Fluid Restriction: no  Last Modified Barium Swallow with Video (Video Swallowing Test): not done    Treatments at the Time of Hospital Discharge:   Respiratory Treatments: none    Oxygen Therapy:  is not on home oxygen therapy. Ventilator:    - No ventilator support    Rehab Therapies: patient able to ambulate    Weight Bearing Status/Restrictions: No weight bearing restirctions  Other Medical Equipment (for information only, NOT a DME order):  hospital bed  Other Treatments: monitor labwork    Patient's personal belongings (please select all that are sent with patient):  None    RN SIGNATURE: Odilia Schroeder    CASE MANAGEMENT/SOCIAL WORK SECTION    Inpatient Status Date: ***    Readmission Risk Assessment Score:  Readmission Risk              Risk of Unplanned Readmission:        11           Discharging to Facility/ Agency   · Name: [facility]   · Address:  · Phone:  · Fax:    Dialysis Facility (if applicable)   · Name:  · Address:  · Dialysis Schedule:  · Phone:  · Fax:    / signature: ROBERT Martinez rn cm     PHYSICIAN SECTION    Prognosis: {Prognosis:6042342070}    Condition at Discharge: [address] Patient Condition:[id number]}    Rehab Potential (if transferring to Rehab): {Prognosis:8856505608}    Recommended Labs or Other Treatments After Discharge: ***    Physician Certification: I certify the above information and transfer of Roula Lerner  is necessary for the continuing treatment of the diagnosis listed and that he requires LTAC for less 30 days.      Update Admission H&P: {CHP DME Changes in ZSNKQ:540362858}    PHYSICIAN SIGNATURE:  Electronically signed by Sergio Petty DO on 6/7/19 at 4:55 PM

## 2019-06-07 NOTE — CARE COORDINATION
0900  Called and spoke with Josie Aguirre. rn at [facility]. She said they are working on [facility] transfer and will call me with an update shortly. Charge rn notified. [address] with Dunia Pandey rn medical management at [facility]. She spoke with Samantha Lopez RN supervisor at [facility] who said the transfer to [facility] will not happen until sometime next week due to person who gives finally approval for transfer of prisoner will not be available until next week. Leti Walker and I discussed a discharge to [place] [phone] until [facility] is straightened out. Leti Walker said she would approve LTAC under single case agreement. I spoke with Justino Peterson from [facility] and he is going to talk with corporate regarding pt.       4918  Spoke with Justino Peterson at [facility] and they have accepted patient. They will have a bed today. Need to wait on final auth from [facility]. Charge rn and pt's nurse notified. Emery Tyson RN notified. Arden Xiao and left voicemail with Dunia Pandey to check on auth for [facility] and had to leave voicemail. Await her return call. [phone]  Per Erla Loge at [facility] they obtained auth for ltac and in process of completing all the paperwork. Erla Loge will notify the [facility] when everything is completed and with bed assignment. Nursing notified. 1600  Pt assigned bed 4213 per Erla Loge at [facility]. Bed will not be ready until after 5pm. Charge rn and pt's nurse notified.

## 2019-06-07 NOTE — PROGRESS NOTES
Hospitalist Progress Note      PCP: No primary care provider on file. Date of Admission: 5/24/2019    Chief Complaint: paraesthesias, spasms    [facility] Course: *He has a known history of having had 3 neck surgeries. He said he has his thyroid removed due to cancer, he then had his parathyroid removed, and he had another surgery that he does not know the reason on  His neck.  He has had muscle spasms, tingling in his hands and mouth, and fatigue, becoming progressively worse   given calcium gluconate and mag . He was on IV calcium and it was discontinued in preparation  For discharge. , then his calcium dropped, so his IV calcium was stopped and his ionized calcium dropped, will need to be set up for OP IV calcium  to [facility] to [facility]     **     Subjective: ** wants to discharge      Medications:  Reviewed    Infusion Medications   Scheduled Medications    calcium acetate  1,334 mg Oral TID WC    calcium carbonate  1,000 mg Oral 4x Daily    magnesium oxide  400 mg Oral BID    calcitRIOL  1 mcg Oral Daily    vitamin D  50,000 Units Oral Weekly    enoxaparin  40 mg Subcutaneous Daily    sodium chloride flush  10 mL Intravenous BID     PRN Meds: HYDROcodone 5 mg - acetaminophen, acetaminophen, trimethobenzamide      Intake/Output Summary (Last 24 hours) at 6/7/2019 1408  Last data filed at 6/7/2019 1215  Gross per 24 hour   Intake 840 ml   Output 2575 ml   Net -1735 ml       Exam:    /70   Pulse 84   Temp 98.4 °F (36.9 °C) (Temporal)   Resp 16   Ht 6' 1\" (1.854 m)   Wt 192 lb 8 oz (87.3 kg)   SpO2 100%   BMI 25.40 kg/m²         Gen: * well developed  HEENT: NC/AT, moist mucous membranes,  Neck: supple, trachea midline, no anterior cervical or SC LAD  Heart:  Normal s1/s2, RRR, no murmurs, gallops, or rubs.    Lungs:  *cta  bilaterally, *  Abd: bowel sounds present, soft, nontender, nondistended, no masses  Extrem:  No clubbing, cyanosis,   No * edema  Skin: no rashes or lesions  Psych: A & O x3  Neuro: grossly intact, moves all four extremities.    Capillary Refill: Brisk,< 3 seconds   Peripheral Pulses: +2 palpable, equal bilaterally                      Labs:   No results for input(s): WBC, HGB, HCT, PLT in the last 72 hours. Recent Labs     19  0626 19  0847 19  0653   PHOS 5.7* 4.0 5.0*     No results for input(s): AST, ALT, BILIDIR, BILITOT, ALKPHOS in the last 72 hours. No results for input(s): INR in the last 72 hours. No results for input(s): Madalynn Sayda in the last 72 hours. No results for input(s): AST, ALT, ALB, BILIDIR, BILITOT, ALKPHOS in the last 72 hours. No results for input(s): LACTA in the last 72 hours.   No results found for: Andie Chandler  No results found for: AMMONIA    Assessment:    Active Hospital Problems    Diagnosis Date Noted    Hypocalcemia [E83.51] 2019    Prolonged Q-T interval on ECG [R94.31] 2019    Hypomagnesemia [E83.42] 2019    H/O thyroidectomy [Z98.890] 2019    Hypocalcemia and hypomagnesemia of  [P71.1] 2019       Plan:   dc to LTAC  Electronically signed by Juan Lopez DO on 2019 at 2:08 PM

## 2019-06-07 NOTE — DISCHARGE SUMMARY
Regular rate and rhythm with normal S1/S2 without murmurs, rubs or gallops. Abdomen: Soft, non-tender, non-distended with normal bowel sounds. Musculoskeletal:  No clubbing, cyanosis or edema bilaterally. Full range of motion without deformity. Skin: Skin color, texture, turgor normal.  No rashes or lesions. Neurologic:  Neurovascularly intact without any focal sensory/motor deficits. Cranial nerves: II-XII intact, grossly non-focal.  Psychiatric:  Alert and oriented, thought content appropriate, normal insight    Consults:     IP CONSULT TO HOSPITALIST  IP CONSULT TO NEPHROLOGY    Labs:  For convenience and continuity at follow-up the following most recent labs are provided:    Lab Results   Component Value Date    WBC 5.7 06/04/2019    HGB 13.1 06/04/2019    HCT 38.7 06/04/2019    MCV 88.2 06/04/2019     06/04/2019     06/04/2019    K 4.0 06/04/2019    K 3.7 05/25/2019     06/04/2019    CO2 28 06/04/2019    BUN 10 06/04/2019    CREATININE 1.0 06/04/2019    CALCIUM 8.5 06/04/2019    PHOS 5.0 06/07/2019    ALKPHOS 96 05/31/2019    ALT 23 05/31/2019    AST 15 05/31/2019    BILITOT 0.7 05/31/2019    LABALBU 4.0 05/31/2019     No results found for: INR    Radiology:  No orders to display       Discharge Medications:   Discharge Medication List as of 6/7/2019  5:15 PM      START taking these medications    Details   magnesium oxide (MAG-OX) 400 (240 Mg) MG tablet Take 1 tablet by mouth 2 times daily, Disp-60 tablet, R-0Print      ergocalciferol (ERGOCALCIFEROL) 25158 units capsule Take 1 capsule by mouth once a week, Disp-5 capsule, R-0Print           Discharge Medication List as of 6/7/2019  5:15 PM      CONTINUE these medications which have CHANGED    Details   calcitRIOL (ROCALTROL) 0.5 MCG capsule Take 2 capsules by mouth daily, Disp-60 capsule, R-3Print      calcium acetate (PHOSLO) 667 MG capsule Take 2 capsules by mouth 3 times daily (with meals), Disp-60 capsule, R-3Print

## 2019-06-07 NOTE — PROGRESS NOTES
Department of Internal Medicine  Nephrology Attending Progress Note    Events reviewed,    SUBJECTIVE:  We are following this patient for hypocalcemia. Reports no complaints. Physical Exam:    VITALS:  /77   Pulse 83   Temp 97.9 °F (36.6 °C) (Temporal)   Resp 18   Ht 6' 1\" (1.854 m)   Wt 192 lb 8 oz (87.3 kg)   SpO2 100%   BMI 25.40 kg/m²   24HR INTAKE/OUTPUT:      Intake/Output Summary (Last 24 hours) at 6/7/2019 1634  Last data filed at 6/7/2019 1415  Gross per 24 hour   Intake 1140 ml   Output 2075 ml   Net -935 ml     GENERAL: Awake alert in no distress  EYES: no pallor, no icterus   NOSE EAR THROAT: no ulcers, no rashes, no drainage   NECK: thyroid not palpable, LN not appreciated   RESP: air entry b/l , No added sounds. CVS: S1 S2 heard, No murmur gallop or rub appreciated   GI: soft, non tender, no organomegaly appreciated, BS +  MS/ Ext: Joints normal, no swelling. No edema, Pulses equal.   NEURO: AAOx3, strength and sensation grossly intact.        DATA:    CBC:   Lab Results   Component Value Date    WBC 5.7 06/04/2019    RBC 4.39 06/04/2019    HGB 13.1 06/04/2019    HCT 38.7 06/04/2019    MCV 88.2 06/04/2019    MCH 29.8 06/04/2019    MCHC 33.9 06/04/2019    RDW 11.8 06/04/2019     06/04/2019    MPV 10.5 06/04/2019     CMP:    Lab Results   Component Value Date     06/04/2019    K 4.0 06/04/2019    K 3.7 05/25/2019     06/04/2019    CO2 28 06/04/2019    BUN 10 06/04/2019    CREATININE 1.0 06/04/2019    GFRAA >60 06/04/2019    LABGLOM >60 06/04/2019    GLUCOSE 99 06/04/2019    PROT 6.5 05/31/2019    LABALBU 4.0 05/31/2019    CALCIUM 8.5 06/04/2019    BILITOT 0.7 05/31/2019    ALKPHOS 96 05/31/2019    AST 15 05/31/2019    ALT 23 05/31/2019     Magnesium:    Lab Results   Component Value Date    MG 1.6 06/07/2019     Phosphorus:    Lab Results   Component Value Date    PHOS 5.0 06/07/2019     Vitamin D 25:16 (low)    Radiology Review:        BRIEF SUMMARY OF INITIAL CONSULT:    Briefly Mr. Brianna Patterson is a 17-year-old man inmate with history of thyroid cancer status post thyroidectomy, as well hypercalcemia due to primary hyperparathyroidism, who underwent recent parathyroidectomy, who was admitted on May 24, 2019 after he was brought to the ER for evaluation of body's disease and numbness. He was found to have severe hypocalcemia with ionized calcium was 0.69 and total calcium was 5.2, reason for this consultation. IMPRESSION/RECOMMENDATIONS:      1. 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 On , calcitriol, ergocalciferol, and oral calcium. · Calcium levels drop this morning after discontinuation of calcium drip. 2. Hypoparathyroidism, status post surgical resection, PTH level 5  3. Hyperphosphatemia, secondary to hypoparathyroidism  4. Hypomagnesemia, probably due to poor oral intake, to continue replacemnt   5.  Vitamin D deficiency, vitamin D 25 level 16, on ergocalciferol, calcitriol    Plan:    · OK to transfer to LTAC  · Replace Mg  · Continue calcitriol  · Continue ergocalciferol 50,000 units weekly  · Continue Calcium carbonate 2 g 4 times a day  · Continue magnesium oxide 400 mg to bid   · Continue to monitor ionized calcium every 6 hours